# Patient Record
Sex: FEMALE | Race: WHITE | Employment: OTHER | URBAN - METROPOLITAN AREA
[De-identification: names, ages, dates, MRNs, and addresses within clinical notes are randomized per-mention and may not be internally consistent; named-entity substitution may affect disease eponyms.]

---

## 2017-03-01 ENCOUNTER — HOSPITAL ENCOUNTER (OUTPATIENT)
Dept: OTHER | Age: 42
Discharge: HOME | End: 2017-03-01
Attending: NURSE PRACTITIONER | Admitting: NURSE PRACTITIONER

## 2017-03-01 LAB
LITHIUM DOSE AMOUNT: NORMAL
LITHIUM LEVEL: 0.7 MMOL/L (ref 0.6–1.2)

## 2017-07-21 PROBLEM — J44.1 COPD WITH ACUTE EXACERBATION (HCC): Status: ACTIVE | Noted: 2017-07-21

## 2017-07-21 PROBLEM — Z72.0 TOBACCO ABUSE: Status: ACTIVE | Noted: 2017-07-21

## 2017-09-10 ENCOUNTER — HOSPITAL ENCOUNTER (OUTPATIENT)
Dept: OTHER | Age: 42
Discharge: HOME | End: 2017-09-10
Attending: NURSE PRACTITIONER | Admitting: NURSE PRACTITIONER

## 2017-09-16 PROBLEM — Z72.0 TOBACCO ABUSE: Status: RESOLVED | Noted: 2017-07-21 | Resolved: 2017-09-16

## 2017-09-16 PROBLEM — F33.2 MDD (MAJOR DEPRESSIVE DISORDER), RECURRENT SEVERE, WITHOUT PSYCHOSIS (HCC): Status: ACTIVE | Noted: 2017-09-16

## 2017-09-16 PROBLEM — J44.1 COPD WITH ACUTE EXACERBATION (HCC): Status: RESOLVED | Noted: 2017-07-21 | Resolved: 2017-09-16

## 2017-11-15 PROBLEM — T56.891A LITHIUM TOXICITY: Status: ACTIVE | Noted: 2017-11-15

## 2017-11-15 PROBLEM — R79.89 ELEVATED LACTIC ACID LEVEL: Status: ACTIVE | Noted: 2017-11-15

## 2017-11-15 PROBLEM — D72.829 LEUKOCYTOSIS: Status: ACTIVE | Noted: 2017-11-15

## 2017-11-15 PROBLEM — G93.40 ACUTE ENCEPHALOPATHY: Status: ACTIVE | Noted: 2017-11-15

## 2017-11-15 PROBLEM — F14.10 COCAINE ABUSE (HCC): Status: ACTIVE | Noted: 2017-11-15

## 2017-11-15 PROBLEM — F16.10 PCP ABUSE (HCC): Status: ACTIVE | Noted: 2017-11-15

## 2017-11-15 PROBLEM — F19.10 SUBSTANCE ABUSE (HCC): Status: ACTIVE | Noted: 2017-11-15

## 2017-11-15 PROBLEM — E87.1 HYPONATREMIA: Status: ACTIVE | Noted: 2017-11-15

## 2017-11-15 PROBLEM — E87.29 HIGH ANION GAP METABOLIC ACIDOSIS: Status: ACTIVE | Noted: 2017-11-15

## 2017-11-15 PROBLEM — R79.89 ELEVATED LFTS: Status: ACTIVE | Noted: 2017-11-15

## 2017-11-15 PROBLEM — A41.9 SEPSIS (HCC): Status: ACTIVE | Noted: 2017-11-15

## 2017-11-15 PROBLEM — N17.9 ACUTE RENAL FAILURE (ARF) (HCC): Status: ACTIVE | Noted: 2017-11-15

## 2023-05-03 ENCOUNTER — APPOINTMENT (OUTPATIENT)
Dept: GENERAL RADIOLOGY | Age: 48
End: 2023-05-03
Payer: MEDICARE

## 2023-05-03 ENCOUNTER — HOSPITAL ENCOUNTER (INPATIENT)
Age: 48
LOS: 2 days | Discharge: PSYCHIATRIC OR SUBSTANCE USE FACILITY | End: 2023-05-05
Attending: EMERGENCY MEDICINE | Admitting: INTERNAL MEDICINE
Payer: MEDICARE

## 2023-05-03 DIAGNOSIS — T56.891A LITHIUM TOXICITY, ACCIDENTAL OR UNINTENTIONAL, INITIAL ENCOUNTER: ICD-10-CM

## 2023-05-03 DIAGNOSIS — T56.894A LITHIUM TOXICITY, UNDETERMINED INTENT, INITIAL ENCOUNTER: Primary | ICD-10-CM

## 2023-05-03 PROBLEM — I95.9 HYPOTENSION: Status: ACTIVE | Noted: 2023-05-03

## 2023-05-03 PROBLEM — G92.8 TOXIC METABOLIC ENCEPHALOPATHY: Status: ACTIVE | Noted: 2017-11-15

## 2023-05-03 LAB
ALBUMIN SERPL-MCNC: 3.2 G/DL (ref 3.4–5)
ALBUMIN SERPL-MCNC: 3.2 G/DL (ref 3.4–5)
ALBUMIN SERPL-MCNC: 3.9 G/DL (ref 3.4–5)
ALBUMIN/GLOB SERPL: 1.1 {RATIO} (ref 1.1–2.2)
ALBUMIN/GLOB SERPL: 1.3 {RATIO} (ref 1.1–2.2)
ALP SERPL-CCNC: 56 U/L (ref 40–129)
ALP SERPL-CCNC: 68 U/L (ref 40–129)
ALT SERPL-CCNC: 10 U/L (ref 10–40)
ALT SERPL-CCNC: 9 U/L (ref 10–40)
ANION GAP SERPL CALCULATED.3IONS-SCNC: 10 MMOL/L (ref 3–16)
ANION GAP SERPL CALCULATED.3IONS-SCNC: 7 MMOL/L (ref 3–16)
ANION GAP SERPL CALCULATED.3IONS-SCNC: 9 MMOL/L (ref 3–16)
AST SERPL-CCNC: 12 U/L (ref 15–37)
AST SERPL-CCNC: 12 U/L (ref 15–37)
BACTERIA URNS QL MICRO: ABNORMAL /HPF
BASE EXCESS BLDV CALC-SCNC: -2.6 MMOL/L (ref -2–3)
BASOPHILS # BLD: 0 K/UL (ref 0–0.2)
BASOPHILS NFR BLD: 0.3 %
BILIRUB SERPL-MCNC: 0.4 MG/DL (ref 0–1)
BILIRUB SERPL-MCNC: 0.6 MG/DL (ref 0–1)
BILIRUB UR QL STRIP.AUTO: ABNORMAL
BUN SERPL-MCNC: 15 MG/DL (ref 7–20)
BUN SERPL-MCNC: 36 MG/DL (ref 7–20)
BUN SERPL-MCNC: 39 MG/DL (ref 7–20)
CALCIUM SERPL-MCNC: 10 MG/DL (ref 8.3–10.6)
CALCIUM SERPL-MCNC: 8.3 MG/DL (ref 8.3–10.6)
CALCIUM SERPL-MCNC: 9.4 MG/DL (ref 8.3–10.6)
CHLORIDE SERPL-SCNC: 100 MMOL/L (ref 99–110)
CHLORIDE SERPL-SCNC: 100 MMOL/L (ref 99–110)
CHLORIDE SERPL-SCNC: 102 MMOL/L (ref 99–110)
CLARITY UR: CLEAR
CO2 BLDV-SCNC: 27 MMOL/L
CO2 SERPL-SCNC: 23 MMOL/L (ref 21–32)
CO2 SERPL-SCNC: 23 MMOL/L (ref 21–32)
CO2 SERPL-SCNC: 24 MMOL/L (ref 21–32)
COHGB MFR BLDV: 1.3 % (ref 0–1.5)
COLOR UR: YELLOW
CREAT SERPL-MCNC: 0.7 MG/DL (ref 0.6–1.1)
CREAT SERPL-MCNC: 1.6 MG/DL (ref 0.6–1.1)
CREAT SERPL-MCNC: 2.1 MG/DL (ref 0.6–1.1)
DEPRECATED RDW RBC AUTO: 13.8 % (ref 12.4–15.4)
DO-HGB MFR BLDV: 65.4 %
EKG ATRIAL RATE: 71 BPM
EKG DIAGNOSIS: NORMAL
EKG P AXIS: 49 DEGREES
EKG P-R INTERVAL: 224 MS
EKG Q-T INTERVAL: 416 MS
EKG QRS DURATION: 88 MS
EKG QTC CALCULATION (BAZETT): 452 MS
EKG R AXIS: 89 DEGREES
EKG T AXIS: 53 DEGREES
EKG VENTRICULAR RATE: 71 BPM
EOSINOPHIL # BLD: 0.2 K/UL (ref 0–0.6)
EOSINOPHIL NFR BLD: 1.3 %
EPI CELLS #/AREA URNS HPF: ABNORMAL /HPF (ref 0–5)
ETHANOLAMINE SERPL-MCNC: NORMAL MG/DL (ref 0–0.08)
GFR SERPLBLD CREATININE-BSD FMLA CKD-EPI: 29 ML/MIN/{1.73_M2}
GFR SERPLBLD CREATININE-BSD FMLA CKD-EPI: 40 ML/MIN/{1.73_M2}
GFR SERPLBLD CREATININE-BSD FMLA CKD-EPI: >60 ML/MIN/{1.73_M2}
GLUCOSE BLD-MCNC: 82 MG/DL (ref 70–99)
GLUCOSE BLD-MCNC: 82 MG/DL (ref 70–99)
GLUCOSE BLD-MCNC: 98 MG/DL (ref 70–99)
GLUCOSE SERPL-MCNC: 79 MG/DL (ref 70–99)
GLUCOSE SERPL-MCNC: 82 MG/DL (ref 70–99)
GLUCOSE SERPL-MCNC: 94 MG/DL (ref 70–99)
GLUCOSE UR STRIP.AUTO-MCNC: NEGATIVE MG/DL
HCO3 BLDV-SCNC: 24.9 MMOL/L (ref 24–28)
HCT VFR BLD AUTO: 43.5 % (ref 36–48)
HGB BLD-MCNC: 14.4 G/DL (ref 12–16)
HGB UR QL STRIP.AUTO: NEGATIVE
INR PPP: 1.12 (ref 0.84–1.16)
KETONES UR STRIP.AUTO-MCNC: ABNORMAL MG/DL
LACTATE BLDV-SCNC: 0.7 MMOL/L (ref 0.4–1.9)
LACTATE BLDV-SCNC: 0.8 MMOL/L (ref 0.4–1.9)
LEUKOCYTE ESTERASE UR QL STRIP.AUTO: NEGATIVE
LITHIUM DOSE: ABNORMAL MG
LITHIUM SERPL-MCNC: 1.4 MMOL/L (ref 0.6–1.2)
LITHIUM SERPL-MCNC: 3.1 MMOL/L (ref 0.6–1.2)
LITHIUM SERPL-MCNC: 3.3 MMOL/L (ref 0.6–1.2)
LITHIUM SERPL-MCNC: 3.7 MMOL/L (ref 0.6–1.2)
LYMPHOCYTES # BLD: 1 K/UL (ref 1–5.1)
LYMPHOCYTES NFR BLD: 7 %
MCH RBC QN AUTO: 31.5 PG (ref 26–34)
MCHC RBC AUTO-ENTMCNC: 33 G/DL (ref 31–36)
MCV RBC AUTO: 95.4 FL (ref 80–100)
METHGB MFR BLDV: 0.4 % (ref 0–1.5)
MONOCYTES # BLD: 1 K/UL (ref 0–1.3)
MONOCYTES NFR BLD: 6.9 %
MUCOUS THREADS #/AREA URNS LPF: ABNORMAL /LPF
NEUTROPHILS # BLD: 12.5 K/UL (ref 1.7–7.7)
NEUTROPHILS NFR BLD: 84.5 %
NITRITE UR QL STRIP.AUTO: NEGATIVE
NT-PROBNP SERPL-MCNC: 1843 PG/ML (ref 0–124)
PCO2 BLDV: 52.1 MMHG (ref 41–51)
PERFORMED ON: NORMAL
PH BLDV: 7.29 [PH] (ref 7.35–7.45)
PH UR STRIP.AUTO: 6 [PH] (ref 5–8)
PHOSPHATE SERPL-MCNC: 2 MG/DL (ref 2.5–4.9)
PLATELET # BLD AUTO: 180 K/UL (ref 135–450)
PMV BLD AUTO: 8.9 FL (ref 5–10.5)
PO2 BLDV: <30 MMHG (ref 25–40)
POTASSIUM SERPL-SCNC: 4.1 MMOL/L (ref 3.5–5.1)
POTASSIUM SERPL-SCNC: 4.4 MMOL/L (ref 3.5–5.1)
POTASSIUM SERPL-SCNC: 4.5 MMOL/L (ref 3.5–5.1)
PROT SERPL-MCNC: 6 G/DL (ref 6.4–8.2)
PROT SERPL-MCNC: 6.8 G/DL (ref 6.4–8.2)
PROT UR STRIP.AUTO-MCNC: 30 MG/DL
PROTHROMBIN TIME: 14.4 SEC (ref 11.5–14.8)
RBC # BLD AUTO: 4.56 M/UL (ref 4–5.2)
RBC #/AREA URNS HPF: ABNORMAL /HPF (ref 0–4)
SALICYLATES SERPL-MCNC: <0.3 MG/DL (ref 15–30)
SAO2 % BLDV: 34 %
SODIUM SERPL-SCNC: 130 MMOL/L (ref 136–145)
SODIUM SERPL-SCNC: 133 MMOL/L (ref 136–145)
SODIUM SERPL-SCNC: 135 MMOL/L (ref 136–145)
SP GR UR STRIP.AUTO: 1.02 (ref 1–1.03)
TROPONIN, HIGH SENSITIVITY: 65 NG/L (ref 0–14)
UA DIPSTICK W REFLEX MICRO PNL UR: YES
URN SPEC COLLECT METH UR: ABNORMAL
UROBILINOGEN UR STRIP-ACNC: 2 E.U./DL
WBC # BLD AUTO: 14.8 K/UL (ref 4–11)
WBC #/AREA URNS HPF: ABNORMAL /HPF (ref 0–5)

## 2023-05-03 PROCEDURE — 36415 COLL VENOUS BLD VENIPUNCTURE: CPT

## 2023-05-03 PROCEDURE — 2580000003 HC RX 258: Performed by: EMERGENCY MEDICINE

## 2023-05-03 PROCEDURE — 80053 COMPREHEN METABOLIC PANEL: CPT

## 2023-05-03 PROCEDURE — 82077 ASSAY SPEC XCP UR&BREATH IA: CPT

## 2023-05-03 PROCEDURE — 05HM33Z INSERTION OF INFUSION DEVICE INTO RIGHT INTERNAL JUGULAR VEIN, PERCUTANEOUS APPROACH: ICD-10-PCS

## 2023-05-03 PROCEDURE — 2580000003 HC RX 258

## 2023-05-03 PROCEDURE — 71045 X-RAY EXAM CHEST 1 VIEW: CPT

## 2023-05-03 PROCEDURE — 83880 ASSAY OF NATRIURETIC PEPTIDE: CPT

## 2023-05-03 PROCEDURE — 99285 EMERGENCY DEPT VISIT HI MDM: CPT

## 2023-05-03 PROCEDURE — 86706 HEP B SURFACE ANTIBODY: CPT

## 2023-05-03 PROCEDURE — 80178 ASSAY OF LITHIUM: CPT

## 2023-05-03 PROCEDURE — 85610 PROTHROMBIN TIME: CPT

## 2023-05-03 PROCEDURE — 80179 DRUG ASSAY SALICYLATE: CPT

## 2023-05-03 PROCEDURE — 83605 ASSAY OF LACTIC ACID: CPT

## 2023-05-03 PROCEDURE — 93005 ELECTROCARDIOGRAM TRACING: CPT | Performed by: EMERGENCY MEDICINE

## 2023-05-03 PROCEDURE — 96361 HYDRATE IV INFUSION ADD-ON: CPT

## 2023-05-03 PROCEDURE — 85025 COMPLETE CBC W/AUTO DIFF WBC: CPT

## 2023-05-03 PROCEDURE — 81001 URINALYSIS AUTO W/SCOPE: CPT

## 2023-05-03 PROCEDURE — 5A1D70Z PERFORMANCE OF URINARY FILTRATION, INTERMITTENT, LESS THAN 6 HOURS PER DAY: ICD-10-PCS | Performed by: INTERNAL MEDICINE

## 2023-05-03 PROCEDURE — 2000000000 HC ICU R&B

## 2023-05-03 PROCEDURE — 90935 HEMODIALYSIS ONE EVALUATION: CPT

## 2023-05-03 PROCEDURE — 6360000002 HC RX W HCPCS

## 2023-05-03 PROCEDURE — C9113 INJ PANTOPRAZOLE SODIUM, VIA: HCPCS

## 2023-05-03 PROCEDURE — 84484 ASSAY OF TROPONIN QUANT: CPT

## 2023-05-03 PROCEDURE — 82803 BLOOD GASES ANY COMBINATION: CPT

## 2023-05-03 PROCEDURE — 87340 HEPATITIS B SURFACE AG IA: CPT

## 2023-05-03 PROCEDURE — 96360 HYDRATION IV INFUSION INIT: CPT

## 2023-05-03 RX ORDER — SODIUM CHLORIDE, SODIUM LACTATE, POTASSIUM CHLORIDE, AND CALCIUM CHLORIDE .6; .31; .03; .02 G/100ML; G/100ML; G/100ML; G/100ML
1000 INJECTION, SOLUTION INTRAVENOUS ONCE
Status: COMPLETED | OUTPATIENT
Start: 2023-05-03 | End: 2023-05-03

## 2023-05-03 RX ORDER — POLYETHYLENE GLYCOL 3350 17 G/17G
17 POWDER, FOR SOLUTION ORAL DAILY PRN
Status: DISCONTINUED | OUTPATIENT
Start: 2023-05-03 | End: 2023-05-06 | Stop reason: HOSPADM

## 2023-05-03 RX ORDER — RISPERIDONE 3 MG/1
3 TABLET ORAL EVERY EVENING
Status: ON HOLD | COMMUNITY
End: 2023-05-05 | Stop reason: HOSPADM

## 2023-05-03 RX ORDER — ACETAMINOPHEN 325 MG/1
650 TABLET ORAL EVERY 6 HOURS PRN
Status: ON HOLD | COMMUNITY
End: 2023-05-11 | Stop reason: HOSPADM

## 2023-05-03 RX ORDER — HYDROXYZINE PAMOATE 50 MG/1
50 CAPSULE ORAL 3 TIMES DAILY PRN
Status: ON HOLD | COMMUNITY
End: 2023-05-11 | Stop reason: HOSPADM

## 2023-05-03 RX ORDER — ACETAMINOPHEN 650 MG/1
650 SUPPOSITORY RECTAL EVERY 6 HOURS PRN
Status: DISCONTINUED | OUTPATIENT
Start: 2023-05-03 | End: 2023-05-06 | Stop reason: HOSPADM

## 2023-05-03 RX ORDER — GLUCAGON 1 MG/ML
1 KIT INJECTION PRN
Status: DISCONTINUED | OUTPATIENT
Start: 2023-05-03 | End: 2023-05-06 | Stop reason: HOSPADM

## 2023-05-03 RX ORDER — ESCITALOPRAM OXALATE 10 MG/1
10 TABLET ORAL DAILY
Status: ON HOLD | COMMUNITY
End: 2023-05-11 | Stop reason: SDUPTHER

## 2023-05-03 RX ORDER — NICOTINE 21 MG/24HR
1 PATCH, TRANSDERMAL 24 HOURS TRANSDERMAL EVERY 24 HOURS
Status: ON HOLD | COMMUNITY
End: 2023-05-11 | Stop reason: HOSPADM

## 2023-05-03 RX ORDER — MAGNESIUM HYDROXIDE/ALUMINUM HYDROXICE/SIMETHICONE 120; 1200; 1200 MG/30ML; MG/30ML; MG/30ML
30 SUSPENSION ORAL EVERY 4 HOURS PRN
Status: ON HOLD | COMMUNITY
End: 2023-05-11 | Stop reason: HOSPADM

## 2023-05-03 RX ORDER — LITHIUM CARBONATE 300 MG/1
600 CAPSULE ORAL 2 TIMES DAILY
Status: ON HOLD | COMMUNITY
End: 2023-05-05 | Stop reason: HOSPADM

## 2023-05-03 RX ORDER — BUPRENORPHINE AND NALOXONE 4; 1 MG/1; MG/1
1 FILM, SOLUBLE BUCCAL; SUBLINGUAL 2 TIMES DAILY
Status: ON HOLD | COMMUNITY
End: 2023-05-11 | Stop reason: HOSPADM

## 2023-05-03 RX ORDER — INSULIN LISPRO 100 [IU]/ML
0-4 INJECTION, SOLUTION INTRAVENOUS; SUBCUTANEOUS NIGHTLY
Status: DISCONTINUED | OUTPATIENT
Start: 2023-05-03 | End: 2023-05-06 | Stop reason: HOSPADM

## 2023-05-03 RX ORDER — LISINOPRIL 20 MG/1
20 TABLET ORAL DAILY
Status: ON HOLD | COMMUNITY
End: 2023-05-05 | Stop reason: HOSPADM

## 2023-05-03 RX ORDER — SODIUM CHLORIDE 0.9 % (FLUSH) 0.9 %
5-40 SYRINGE (ML) INJECTION EVERY 12 HOURS SCHEDULED
Status: DISCONTINUED | OUTPATIENT
Start: 2023-05-03 | End: 2023-05-06 | Stop reason: HOSPADM

## 2023-05-03 RX ORDER — PANTOPRAZOLE SODIUM 40 MG/10ML
40 INJECTION, POWDER, LYOPHILIZED, FOR SOLUTION INTRAVENOUS DAILY
Status: DISCONTINUED | OUTPATIENT
Start: 2023-05-03 | End: 2023-05-06 | Stop reason: HOSPADM

## 2023-05-03 RX ORDER — DEXTROSE MONOHYDRATE 100 MG/ML
INJECTION, SOLUTION INTRAVENOUS CONTINUOUS PRN
Status: DISCONTINUED | OUTPATIENT
Start: 2023-05-03 | End: 2023-05-06 | Stop reason: HOSPADM

## 2023-05-03 RX ORDER — HEPARIN SODIUM 1000 [USP'U]/ML
2400 INJECTION, SOLUTION INTRAVENOUS; SUBCUTANEOUS PRN
Status: DISCONTINUED | OUTPATIENT
Start: 2023-05-03 | End: 2023-05-06 | Stop reason: HOSPADM

## 2023-05-03 RX ORDER — SODIUM CHLORIDE, SODIUM LACTATE, POTASSIUM CHLORIDE, CALCIUM CHLORIDE 600; 310; 30; 20 MG/100ML; MG/100ML; MG/100ML; MG/100ML
1000 INJECTION, SOLUTION INTRAVENOUS CONTINUOUS
Status: DISCONTINUED | OUTPATIENT
Start: 2023-05-03 | End: 2023-05-03

## 2023-05-03 RX ORDER — FENOFIBRATE 160 MG/1
160 TABLET ORAL DAILY
Status: ON HOLD | COMMUNITY
End: 2023-05-11 | Stop reason: HOSPADM

## 2023-05-03 RX ORDER — ACETAMINOPHEN 325 MG/1
650 TABLET ORAL EVERY 6 HOURS PRN
Status: DISCONTINUED | OUTPATIENT
Start: 2023-05-03 | End: 2023-05-06 | Stop reason: HOSPADM

## 2023-05-03 RX ORDER — SODIUM CHLORIDE 9 MG/ML
INJECTION, SOLUTION INTRAVENOUS CONTINUOUS
Status: ACTIVE | OUTPATIENT
Start: 2023-05-03 | End: 2023-05-03

## 2023-05-03 RX ORDER — SODIUM CHLORIDE 0.9 % (FLUSH) 0.9 %
5-40 SYRINGE (ML) INJECTION PRN
Status: DISCONTINUED | OUTPATIENT
Start: 2023-05-03 | End: 2023-05-06 | Stop reason: HOSPADM

## 2023-05-03 RX ORDER — INSULIN GLARGINE 100 [IU]/ML
15 INJECTION, SOLUTION SUBCUTANEOUS 2 TIMES DAILY
Status: ON HOLD | COMMUNITY
End: 2023-05-05 | Stop reason: HOSPADM

## 2023-05-03 RX ORDER — SODIUM CHLORIDE, SODIUM LACTATE, POTASSIUM CHLORIDE, CALCIUM CHLORIDE 600; 310; 30; 20 MG/100ML; MG/100ML; MG/100ML; MG/100ML
1000 INJECTION, SOLUTION INTRAVENOUS CONTINUOUS
Status: ACTIVE | OUTPATIENT
Start: 2023-05-03 | End: 2023-05-03

## 2023-05-03 RX ORDER — SODIUM CHLORIDE 9 MG/ML
INJECTION, SOLUTION INTRAVENOUS PRN
Status: DISCONTINUED | OUTPATIENT
Start: 2023-05-03 | End: 2023-05-06 | Stop reason: HOSPADM

## 2023-05-03 RX ORDER — LEVOTHYROXINE SODIUM 0.03 MG/1
25 TABLET ORAL
Status: ON HOLD | COMMUNITY
End: 2023-05-11 | Stop reason: SDUPTHER

## 2023-05-03 RX ORDER — HEPARIN SODIUM 1000 [USP'U]/ML
10000 INJECTION, SOLUTION INTRAVENOUS; SUBCUTANEOUS ONCE
Status: COMPLETED | OUTPATIENT
Start: 2023-05-03 | End: 2023-05-03

## 2023-05-03 RX ORDER — INSULIN LISPRO 100 [IU]/ML
0-4 INJECTION, SOLUTION INTRAVENOUS; SUBCUTANEOUS
Status: DISCONTINUED | OUTPATIENT
Start: 2023-05-03 | End: 2023-05-06 | Stop reason: HOSPADM

## 2023-05-03 RX ADMIN — SODIUM CHLORIDE: 9 INJECTION, SOLUTION INTRAVENOUS at 14:55

## 2023-05-03 RX ADMIN — SODIUM CHLORIDE, POTASSIUM CHLORIDE, SODIUM LACTATE AND CALCIUM CHLORIDE 1000 ML: 600; 310; 30; 20 INJECTION, SOLUTION INTRAVENOUS at 12:37

## 2023-05-03 RX ADMIN — SODIUM CHLORIDE, POTASSIUM CHLORIDE, SODIUM LACTATE AND CALCIUM CHLORIDE 1000 ML: 600; 310; 30; 20 INJECTION, SOLUTION INTRAVENOUS at 10:06

## 2023-05-03 RX ADMIN — PANTOPRAZOLE SODIUM 40 MG: 40 INJECTION, POWDER, FOR SOLUTION INTRAVENOUS at 14:57

## 2023-05-03 RX ADMIN — HEPARIN SODIUM 10000 UNITS: 1000 INJECTION INTRAVENOUS; SUBCUTANEOUS at 16:24

## 2023-05-03 ASSESSMENT — PAIN - FUNCTIONAL ASSESSMENT: PAIN_FUNCTIONAL_ASSESSMENT: NONE - DENIES PAIN

## 2023-05-03 NOTE — ED PROVIDER NOTES
personality disorder ([facility] CAP (community acquired pneumonia), Chronic kidney disease, Depression, Diabetes mellitus ([facility] Drug abuse ([facility] GERD (gastroesophageal reflux disease), Headache, Hyperlipidemia, Influenza A, Morbid obesity ([facility] Narcotic addiction ([facility] Pain management, Pseudotumor cerebri, Sleep apnea, Suicide ideation, and Wears glasses. She has a past surgical history that includes Gallbladder surgery; Tubal ligation; Dilation and curettage of uterus; Tonsillectomy; Cystoscopy; Endometrial ablation; Cholecystectomy; fracture surgery; Colonoscopy; Upper gastrointestinal endoscopy (2012); Cystocopy (5/816/14); knee surgery; and Endoscopy, colon, diagnostic. Her family history includes Cancer in her maternal grandmother; Diabetes in her father, maternal aunt, maternal grandfather, maternal grandmother, maternal uncle, and mother; High Blood Pressure in her maternal grandfather, maternal grandmother, and mother; High Cholesterol in her mother. She reports that she has quit smoking. Her smoking use included cigarettes. She has a 6.00 pack-year smoking history. She has never used smokeless tobacco. She reports current drug use. Drugs: IV and Opiates . She reports that she does not drink alcohol. Medications     Previous Medications    ACETAMINOPHEN (TYLENOL) 325 MG TABLET    Take 2 tablets by mouth every 6 hours as needed for Pain (headache)    ALUMINUM & MAGNESIUM HYDROXIDE-SIMETHICONE (MAALOX) 200-200-20 MG/5ML SUSP SUSPENSION    Take 30 mLs by mouth every 4 hours as needed for Indigestion    BUPRENORPHINE-NALOXONE (SUBOXONE) 4-1 MG FILM SL FILM    Place 1 Film under the tongue 2 times daily.  for OUD    ESCITALOPRAM (LEXAPRO) 10 MG TABLET    Take 1 tablet by mouth daily    FENOFIBRATE (TRIGLIDE) 160 MG TABLET    Take 1 tablet by mouth daily    HYDROXYZINE PAMOATE (VISTARIL) 50 MG CAPSULE    Take 1 capsule by mouth 3 times daily as needed for Anxiety    INSULIN GLARGINE (LANTUS) 100 UNIT/ML

## 2023-05-03 NOTE — H&P
ICU HISTORY AND PHYSICAL       Hospital Day: 1  ICU Day: 1                                                         Code:Full Code  Admit Date: 5/3/2023  PCP: Anahi Avina                                  CC: lethargy    HISTORY OF PRESENT ILLNESS:   This is a 56yo F with Pmhx of severe borderline personality disorder, bipolar disorder, DM2, HLD, morbid obesity who presents with lethargy. Patient lives at [facility]. Per staff there, patient was increasingly lethargic and difficult to arouse over the last 2-3 days. She was sleeping all day and would \"sleep while standing up. \" When staff would give her meals or drinks, she would not be awake enough to eat or drink. She is on lithium, risperidone, seroquel, escitalopram at home. Additionally, she is on lisinopril and hydroxyzine. These medications have been held since 4/30 when the lethargy started. No recent medications changes or new medications. Today, patient is able to be aroused by voice, however she falls asleep very quickly. She states she has not been eating or drinking very well. In the ED, patient was hypotensive to 80s/40s with improvement to 100s/60s after 2L IVF boluses. Labs significant for NERY with Cr 2.1, BUN 39, hyponatremia 133, leukocytosis to 14.8. Her lithium level was elevated to 3.7. UA with trace ketones. VBG 7.288, pCO2 52.1, HCO3 24.9. Chest XR with low lung volumes and mild pulmonary vascular congestion. Poison control contacted and they recommend continued hydration, Q4hr lithium checks. Nephrology contacted ASAP and they will start patient on dialysis. General surgery consulted for vas cath placement.     PAST HISTORY:     Past Medical History:   Diagnosis Date    Arthritis     Asthma     Bipolar disorder ([facility]     Borderline personality disorder ([facility]     CAP (community acquired pneumonia) 9/29/2015    Chronic kidney disease     proteinuria    Depression     Diabetes mellitus ([address]     Drug abuse ([facility]

## 2023-05-03 NOTE — ED NOTES
Clinical Pharmacy Progress Note  Medication History     ED Encounter Date: 5/3/23 at 10:58 AM     List of of current medications patient is taking is complete. Home Medication list in EPIC updated to reflect changes noted below. Source of information: Transfer documents provided by patient's nursing facility [facility]) available at bedside    Updated Prior to Admission Medication List (as of 5/3/2023 10:58 AM):   Current Outpatient Medications   Medication Instructions    acetaminophen (TYLENOL) 650 mg, Oral, EVERY 6 HOURS PRN    aluminum & magnesium hydroxide-simethicone (MAALOX) 200-200-20 MG/5ML SUSP suspension 30 mLs, Oral, EVERY 4 HOURS PRN    buprenorphine-naloxone (SUBOXONE) 4-1 MG FILM SL film 1 Film, SubLINGual, 2 TIMES DAILY, for OUD    escitalopram (LEXAPRO) 10 mg, Oral, DAILY    fenofibrate (TRIGLIDE) 160 mg, Oral, DAILY    hydrOXYzine pamoate (VISTARIL) 50 mg, Oral, 3 TIMES DAILY PRN    insulin glargine (LANTUS) 15 Units, SubCUTAneous, 2 TIMES DAILY    insulin lispro (HUMALOG) 100 UNIT/ML injection vial Inject sliding scale insulin lispro SC four times daily (before meals and at bedtime) as needed per sliding scale instructions below. <BR>BG : 0 units<BR>200-249: 2 units<BR>250-299: 4 units<BR>300-349: 6 units<BR>350-399: 8 units<BR>> or = 400: 10 units    levothyroxine (SYNTHROID) 25 mcg, Oral, DAILY BEFORE BREAKFAST    lisinopril (PRINIVIL;ZESTRIL) 20 mg, Oral, DAILY    lithium 600 mg, Oral, 2 TIMES DAILY    nicotine (NICODERM CQ) 21 MG/24HR 1 patch, TransDERmal, EVERY 24 HOURS    risperiDONE (RISPERDAL) 3 mg, Oral, EVERY EVENING       Please call with any questions.     Barbee Nyhan, PharmD, [facility]  Clinical Pharmacist - Emergency Dept  Wireless: [phone]  5/3/2023 10:59 AM

## 2023-05-03 NOTE — PROCEDURES
Valentín Bobo is a 53 y.o. female patient. 1. Lithium toxicity, undetermined intent, initial encounter      Past Medical History:   Diagnosis Date    Arthritis     Asthma     Bipolar disorder ([facility]     Borderline personality disorder ([facility]     CAP (community acquired pneumonia) 9/29/2015    Chronic kidney disease     proteinuria    Depression     Diabetes mellitus ([facility]     Drug abuse ([facility]     GERD (gastroesophageal reflux disease)     Headache     Hyperlipidemia     Influenza A 12/31/14    Morbid obesity ([facility]     Narcotic addiction ([facility]     Pain management     Pseudotumor cerebri     dx 3/12 by neurologist. OP [id number] 11/13/13    Sleep apnea     Suicide ideation     chronic    Wears glasses      Blood pressure 114/69, pulse 70, temperature 98.6 °F (37 °C), temperature source Oral, resp. rate 28, weight 238 lb 1.6 oz (108 kg), SpO2 92 %, not currently breastfeeding. Central Line    Date/Time: 5/3/2023 4:33 PM  Performed by: Andrew Rudolph DO  Authorized by: Bessie Lira MD   Consent: The procedure was performed in an emergent situation. Test results: test results available and properly labeled  Site marked: the operative site was marked  Imaging studies: imaging studies available  Required items: required blood products, implants, devices, and special equipment available  Patient identity confirmed: arm band and hospital-assigned identification number  Time out: Immediately prior to procedure a \"time out\" was called to verify the correct patient, procedure, equipment, support staff and site/side marked as required.   Indications: vascular access    Anesthesia:  Local Anesthetic: lidocaine 1% without epinephrine  Anesthetic total: 10 mL    Sedation:  Patient sedated: no    Preparation: skin prepped with ChloraPrep  Skin prep agent dried: skin prep agent completely dried prior to procedure  Sterile barriers: all five maximum sterile barriers used - cap, mask, sterile gown, sterile gloves, and

## 2023-05-03 NOTE — ED NOTES
Pt placed on bedpan but unable to void, external catheter in place      Elisabeth Garcia, RN  05/03/23 [id number]

## 2023-05-04 LAB
ALBUMIN SERPL-MCNC: 3.2 G/DL (ref 3.4–5)
ALBUMIN SERPL-MCNC: 3.3 G/DL (ref 3.4–5)
ALBUMIN/GLOB SERPL: 1.2 {RATIO} (ref 1.1–2.2)
ALP SERPL-CCNC: 65 U/L (ref 40–129)
ALT SERPL-CCNC: 9 U/L (ref 10–40)
ANION GAP SERPL CALCULATED.3IONS-SCNC: 8 MMOL/L (ref 3–16)
ANION GAP SERPL CALCULATED.3IONS-SCNC: 9 MMOL/L (ref 3–16)
AST SERPL-CCNC: 12 U/L (ref 15–37)
BASOPHILS # BLD: 0.1 K/UL (ref 0–0.2)
BASOPHILS NFR BLD: 0.5 %
BILIRUB SERPL-MCNC: 0.6 MG/DL (ref 0–1)
BUN SERPL-MCNC: 10 MG/DL (ref 7–20)
BUN SERPL-MCNC: 13 MG/DL (ref 7–20)
CALCIUM SERPL-MCNC: 8.7 MG/DL (ref 8.3–10.6)
CALCIUM SERPL-MCNC: 8.8 MG/DL (ref 8.3–10.6)
CHLORIDE SERPL-SCNC: 103 MMOL/L (ref 99–110)
CHLORIDE SERPL-SCNC: 105 MMOL/L (ref 99–110)
CO2 SERPL-SCNC: 24 MMOL/L (ref 21–32)
CO2 SERPL-SCNC: 24 MMOL/L (ref 21–32)
CREAT SERPL-MCNC: 0.7 MG/DL (ref 0.6–1.1)
CREAT SERPL-MCNC: 0.7 MG/DL (ref 0.6–1.1)
DEPRECATED RDW RBC AUTO: 13.9 % (ref 12.4–15.4)
EOSINOPHIL # BLD: 0.4 K/UL (ref 0–0.6)
EOSINOPHIL NFR BLD: 3.9 %
GFR SERPLBLD CREATININE-BSD FMLA CKD-EPI: >60 ML/MIN/{1.73_M2}
GFR SERPLBLD CREATININE-BSD FMLA CKD-EPI: >60 ML/MIN/{1.73_M2}
GLUCOSE BLD-MCNC: 123 MG/DL (ref 70–99)
GLUCOSE BLD-MCNC: 168 MG/DL (ref 70–99)
GLUCOSE BLD-MCNC: 185 MG/DL (ref 70–99)
GLUCOSE BLD-MCNC: 60 MG/DL (ref 70–99)
GLUCOSE BLD-MCNC: 66 MG/DL (ref 70–99)
GLUCOSE BLD-MCNC: 80 MG/DL (ref 70–99)
GLUCOSE BLD-MCNC: 97 MG/DL (ref 70–99)
GLUCOSE SERPL-MCNC: 68 MG/DL (ref 70–99)
GLUCOSE SERPL-MCNC: 70 MG/DL (ref 70–99)
HBV SURFACE AB SERPL IA-ACNC: <3.5 MIU/ML
HBV SURFACE AG SERPL QL IA: NORMAL
HCT VFR BLD AUTO: 39.4 % (ref 36–48)
HGB BLD-MCNC: 13.2 G/DL (ref 12–16)
LACTATE BLDV-SCNC: 0.7 MMOL/L (ref 0.4–2)
LITHIUM DOSE: ABNORMAL MG
LITHIUM DOSE: NORMAL MG
LITHIUM DOSE: NORMAL MG
LITHIUM SERPL-MCNC: 1.3 MMOL/L (ref 0.6–1.2)
LITHIUM SERPL-MCNC: 1.3 MMOL/L (ref 0.6–1.2)
LITHIUM SERPL-MCNC: 1.4 MMOL/L (ref 0.6–1.2)
LITHIUM SERPL-MCNC: 1.4 MMOL/L (ref 0.6–1.2)
LITHIUM SERPL-MCNC: 1.5 MMOL/L (ref 0.6–1.2)
LITHIUM SERPL-MCNC: 2.5 MMOL/L (ref 0.6–1.2)
LYMPHOCYTES # BLD: 1.5 K/UL (ref 1–5.1)
LYMPHOCYTES NFR BLD: 14.8 %
MAGNESIUM SERPL-MCNC: 1.8 MG/DL (ref 1.8–2.4)
MCH RBC QN AUTO: 31.5 PG (ref 26–34)
MCHC RBC AUTO-ENTMCNC: 33.3 G/DL (ref 31–36)
MCV RBC AUTO: 94.5 FL (ref 80–100)
MONOCYTES # BLD: 1.1 K/UL (ref 0–1.3)
MONOCYTES NFR BLD: 10.6 %
NEUTROPHILS # BLD: 7.3 K/UL (ref 1.7–7.7)
NEUTROPHILS NFR BLD: 70.2 %
PERFORMED ON: ABNORMAL
PERFORMED ON: NORMAL
PERFORMED ON: NORMAL
PHOSPHATE SERPL-MCNC: 2 MG/DL (ref 2.5–4.9)
PLATELET # BLD AUTO: 155 K/UL (ref 135–450)
PMV BLD AUTO: 8.8 FL (ref 5–10.5)
POTASSIUM SERPL-SCNC: 3.8 MMOL/L (ref 3.5–5.1)
POTASSIUM SERPL-SCNC: 3.9 MMOL/L (ref 3.5–5.1)
PROT SERPL-MCNC: 6 G/DL (ref 6.4–8.2)
RBC # BLD AUTO: 4.17 M/UL (ref 4–5.2)
SODIUM SERPL-SCNC: 136 MMOL/L (ref 136–145)
SODIUM SERPL-SCNC: 137 MMOL/L (ref 136–145)
TROPONIN, HIGH SENSITIVITY: 45 NG/L (ref 0–14)
WBC # BLD AUTO: 10.4 K/UL (ref 4–11)

## 2023-05-04 PROCEDURE — 36415 COLL VENOUS BLD VENIPUNCTURE: CPT

## 2023-05-04 PROCEDURE — 2060000000 HC ICU INTERMEDIATE R&B

## 2023-05-04 PROCEDURE — 6360000002 HC RX W HCPCS

## 2023-05-04 PROCEDURE — 84484 ASSAY OF TROPONIN QUANT: CPT

## 2023-05-04 PROCEDURE — 2580000003 HC RX 258

## 2023-05-04 PROCEDURE — 80053 COMPREHEN METABOLIC PANEL: CPT

## 2023-05-04 PROCEDURE — 93005 ELECTROCARDIOGRAM TRACING: CPT

## 2023-05-04 PROCEDURE — 92610 EVALUATE SWALLOWING FUNCTION: CPT

## 2023-05-04 PROCEDURE — 83735 ASSAY OF MAGNESIUM: CPT

## 2023-05-04 PROCEDURE — 6370000000 HC RX 637 (ALT 250 FOR IP)

## 2023-05-04 PROCEDURE — 83605 ASSAY OF LACTIC ACID: CPT

## 2023-05-04 PROCEDURE — C9113 INJ PANTOPRAZOLE SODIUM, VIA: HCPCS

## 2023-05-04 PROCEDURE — 80178 ASSAY OF LITHIUM: CPT

## 2023-05-04 PROCEDURE — 99223 1ST HOSP IP/OBS HIGH 75: CPT | Performed by: INTERNAL MEDICINE

## 2023-05-04 PROCEDURE — 85025 COMPLETE CBC W/AUTO DIFF WBC: CPT

## 2023-05-04 PROCEDURE — 2500000003 HC RX 250 WO HCPCS

## 2023-05-04 RX ORDER — HEPARIN SODIUM 5000 [USP'U]/ML
5000 INJECTION, SOLUTION INTRAVENOUS; SUBCUTANEOUS EVERY 8 HOURS SCHEDULED
Status: DISCONTINUED | OUTPATIENT
Start: 2023-05-04 | End: 2023-05-06 | Stop reason: HOSPADM

## 2023-05-04 RX ORDER — HALOPERIDOL 5 MG/1
5 TABLET ORAL EVERY 6 HOURS PRN
Status: DISCONTINUED | OUTPATIENT
Start: 2023-05-04 | End: 2023-05-06 | Stop reason: HOSPADM

## 2023-05-04 RX ORDER — HALOPERIDOL 5 MG/ML
5 INJECTION INTRAMUSCULAR EVERY 6 HOURS PRN
Status: DISCONTINUED | OUTPATIENT
Start: 2023-05-04 | End: 2023-05-06 | Stop reason: HOSPADM

## 2023-05-04 RX ORDER — NICOTINE 21 MG/24HR
1 PATCH, TRANSDERMAL 24 HOURS TRANSDERMAL EVERY 24 HOURS
Status: DISCONTINUED | OUTPATIENT
Start: 2023-05-04 | End: 2023-05-06 | Stop reason: HOSPADM

## 2023-05-04 RX ADMIN — SODIUM CHLORIDE, PRESERVATIVE FREE 10 ML: 5 INJECTION INTRAVENOUS at 20:51

## 2023-05-04 RX ADMIN — HEPARIN SODIUM 5000 UNITS: 5000 INJECTION INTRAVENOUS; SUBCUTANEOUS at 15:01

## 2023-05-04 RX ADMIN — PANTOPRAZOLE SODIUM 40 MG: 40 INJECTION, POWDER, FOR SOLUTION INTRAVENOUS at 09:38

## 2023-05-04 RX ADMIN — HEPARIN SODIUM 5000 UNITS: 5000 INJECTION INTRAVENOUS; SUBCUTANEOUS at 22:11

## 2023-05-04 RX ADMIN — SODIUM PHOSPHATE, MONOBASIC, MONOHYDRATE 20 MMOL: 276; 142 INJECTION, SOLUTION INTRAVENOUS at 01:58

## 2023-05-04 RX ADMIN — Medication 500 MG: at 20:47

## 2023-05-04 RX ADMIN — Medication 1000 MG: at 09:38

## 2023-05-04 RX ADMIN — DEXTROSE AND SODIUM CHLORIDE: 5; 900 INJECTION, SOLUTION INTRAVENOUS at 07:07

## 2023-05-04 NOTE — CONSULTS
ICU Consult Note  PGY-1    PCP: Celina Lizarraga    Code:Full Code  Admit Date: 5/3/2023  Vent Settings:    / / /   Diet: ADULT DIET; Regular; 3 carb choices (45 gm/meal)    Interval History  Patient was seen and examined at bedside. More awake and interactive this morning. She complained of chest tightness which has since resolved. NERY has resolved, serum Lithium is WNL  She will be started on a diet and transferred to the floors. History of present illness: This is a 56yo F with Pmhx of severe borderline personality disorder, bipolar disorder, DM2, HLD, morbid obesity who presents with lethargy. Patient lives at [facility]. Per staff there, patient was increasingly lethargic and difficult to arouse over the last 2-3 days. She was sleeping all day and would \"sleep while standing up. \" When staff would give her meals or drinks, she would not be awake enough to eat or drink. She is on lithium, risperidone, seroquel, escitalopram at home. Additionally, she is on lisinopril and hydroxyzine. These medications have been held since 4/30 when the lethargy started. No recent medications changes or new medications. Today, patient is able to be aroused by voice, however she falls asleep very quickly. She states she has not been eating or drinking very well. In the ED, patient was hypotensive to 80s/40s with improvement to 100s/60s after 2L IVF boluses. Labs significant for NERY with Cr 2.1, BUN 39, hyponatremia 133, leukocytosis to 14.8. Her lithium level was elevated to 3.7. UA with trace ketones. VBG 7.288, pCO2 52.1, HCO3 24.9. Chest XR with low lung volumes and mild pulmonary vascular congestion. Poison control contacted and they recommend continued hydration, Q4hr lithium checks. Nephrology contacted ASAP and they will start patient on dialysis. General surgery consulted for vas cath placement.     Past Medical / Surgical History:    Past Medical History:   Diagnosis Date
Nephrology Consult Note  [phone]   [email or web address]        Reason for Consult:  Lithium toxicity     HISTORY OF PRESENT ILLNESS:                This is a patient with significant past medical history of bipolar illness on lithium  who presents with encephalopathy ,lithium toxicity and NERY she is in ECF and meds are reviewed as per pharmacist, patient is also on Lisinopril. She has an inital lithium level of 3.7, Cr of 2.1 with AND she was in [facility] ED in feb 2023 her Cr was normal and Li level was 1.2 ,she has been in ECF due to suicidal ideation. She is somnolent and confused and unable to answer questions . Past Medical History:        Diagnosis Date    Arthritis     Asthma     Bipolar disorder ([address]     Borderline personality disorder ([address]     CAP (community acquired pneumonia) 9/29/2015    Chronic kidney disease     proteinuria    Depression     Diabetes mellitus ([address]     Drug abuse ([address]     GERD (gastroesophageal reflux disease)     Headache     Hyperlipidemia     Influenza A 12/31/14    Morbid obesity ([address]     Narcotic addiction ([address]     Pain management     Pseudotumor cerebri     dx 3/12 by neurologist. OP [id number] 11/13/13    Sleep apnea     Suicide ideation     chronic    Wears glasses        Past Surgical History:        Procedure Laterality Date    CHOLECYSTECTOMY      COLONOSCOPY      17 YRS AGO ,NORMAL    CYSTOSCOPY      CYSTOSCOPY  5/816/14    CYSTOSCOPY, URETHRAL DILATATION      DILATION AND CURETTAGE OF UTERUS      ENDOMETRIAL ABLATION      ENDOSCOPY, COLON, DIAGNOSTIC      FRACTURE SURGERY      femur, left    GALLBLADDER SURGERY      KNEE SURGERY      TONSILLECTOMY      TUBAL LIGATION      UPPER GASTROINTESTINAL ENDOSCOPY  2012    DONE X2 GERD       Current Medications:    No current facility-administered medications on file prior to encounter.      Current Outpatient Medications on File Prior to Encounter   Medication Sig Dispense Refill    buprenorphine-naloxone (SUBOXONE) 4-1
Psychiatry Initial Consultation    Patient Name: Kehinde Hsieh  MRN: [id number]  Admission Date: 5/3/2023    Reason for Consult: PMH of borderline personality disorder, suicidal ideation, came in with lithium toxicity, suspected suicidal ideation on this admission; from inpatient psych    HPI:   Kehinde Hsieh is a 53 y.o. female who presented to the hospital from [facility] on 05/03/2023 with a chief complaint of altered mental status. Per ED documentation, It is unclear when the patient was last seen normal. She reportedly is typically alert and oriented and is in the psychiatric facility voluntarily due to suicidal ideation. Patient is quite somnolent and falls asleep easily so unable to give any information about why she is here or what might be bothering her. She was sent with a medication list that shows that yesterday evening she received her regular doses of Seroquel and risperidone. No medications were given this morning. Her Lithium level was found to be 3.7. Chart review indicates a history of being on dual antipsychotic therapy. Also has a history of being on multiple mood stabilizers at the same time. Met with Gita, who appeared somnolent; responded to questions but would trail off and not complete her thought or sentence. She believes she is currently on an inpatient psychiatric unit; able to identify it is May 2023. She is unable to tell me why she is in the hospital, \"I don't know\". When asked why she was at [facility] prior to admission to [facility] states \"I don't know\". Unable to identify if she was experiencing depression or suicidal ideation prior to [facility]. When asked about medications, specifically lithium, she was unable to tell me how long she has been on it or if she has had recent dose changes. Also unable to tell me if she had been eating or drinking normally; chart review indicates she has not.  She reports she is established with an outpatient psychiatrist who she sees through telehealth (
daily   Yes Historical Provider, MD   insulin glargine (LANTUS) 100 UNIT/ML injection vial Inject 15 Units into the skin 2 times daily   Yes Historical Provider, MD   levothyroxine (SYNTHROID) 25 MCG tablet Take 1 tablet by mouth every morning (before breakfast)   Yes Historical Provider, MD   lisinopril (PRINIVIL;ZESTRIL) 20 MG tablet Take 1 tablet by mouth daily   Yes Historical Provider, MD   lithium 300 MG capsule Take 2 capsules by mouth 2 times daily   Yes Historical Provider, MD   nicotine (NICODERM CQ) 21 MG/24HR Place 1 patch onto the skin every 24 hours   Yes Historical Provider, MD   acetaminophen (TYLENOL) 325 MG tablet Take 2 tablets by mouth every 6 hours as needed for Pain (headache)   Yes Historical Provider, MD   aluminum & magnesium hydroxide-simethicone (MAALOX) 200-200-20 MG/5ML SUSP suspension Take 30 mLs by mouth every 4 hours as needed for Indigestion   Yes Historical Provider, MD   insulin lispro (HUMALOG) 100 UNIT/ML injection vial Inject sliding scale insulin lispro SC four times daily (before meals and at bedtime) as needed per sliding scale instructions below.   BG : 0 units  200-249: 2 units  250-299: 4 units  300-349: 6 units  350-399: 8 units  > or = 400: 10 units   Yes Historical Provider, MD   risperiDONE (RISPERDAL) 3 MG tablet Take 1 tablet by mouth every evening   Yes Historical Provider, MD   hydrOXYzine pamoate (VISTARIL) 50 MG capsule Take 1 capsule by mouth 3 times daily as needed for Anxiety   Yes Historical Provider, MD       Allergies:  Olanzapine, Clozapine [clozapine], Depakote [divalproex sodium], Pregabalin, Seroquel [quetiapine fumarate], Ultram [tramadol hcl], Baclofen, Byetta 10 mcg pen [exenatide], Cephalexin, Cymbalta [duloxetine hcl], Imitrex [sumatriptan], Naproxen, Pcn [penicillins], Prozac [fluoxetine hcl], and Victoza [liraglutide]    Family History:  family history includes Cancer in her maternal grandmother; Diabetes in her father, maternal aunt,

## 2023-05-05 VITALS
BODY MASS INDEX: 42.53 KG/M2 | HEART RATE: 59 BPM | TEMPERATURE: 98.5 F | RESPIRATION RATE: 14 BRPM | OXYGEN SATURATION: 95 % | HEIGHT: 64 IN | SYSTOLIC BLOOD PRESSURE: 139 MMHG | DIASTOLIC BLOOD PRESSURE: 83 MMHG | WEIGHT: 249.12 LBS

## 2023-05-05 PROBLEM — F25.0 SCHIZOAFFECTIVE DISORDER, BIPOLAR TYPE (HCC): Status: ACTIVE | Noted: 2023-05-05

## 2023-05-05 LAB
ALBUMIN SERPL-MCNC: 3.4 G/DL (ref 3.4–5)
ALBUMIN/GLOB SERPL: 1.3 {RATIO} (ref 1.1–2.2)
ALP SERPL-CCNC: 67 U/L (ref 40–129)
ALT SERPL-CCNC: 8 U/L (ref 10–40)
AMPHETAMINES UR QL SCN>1000 NG/ML: NORMAL
ANION GAP SERPL CALCULATED.3IONS-SCNC: 9 MMOL/L (ref 3–16)
AST SERPL-CCNC: 11 U/L (ref 15–37)
BARBITURATES UR QL SCN>200 NG/ML: NORMAL
BASOPHILS # BLD: 0.1 K/UL (ref 0–0.2)
BASOPHILS NFR BLD: 0.7 %
BENZODIAZ UR QL SCN>200 NG/ML: NORMAL
BILIRUB SERPL-MCNC: 0.4 MG/DL (ref 0–1)
BUN SERPL-MCNC: 6 MG/DL (ref 7–20)
CALCIUM SERPL-MCNC: 9.7 MG/DL (ref 8.3–10.6)
CANNABINOIDS UR QL SCN>50 NG/ML: NORMAL
CHLORIDE SERPL-SCNC: 103 MMOL/L (ref 99–110)
CO2 SERPL-SCNC: 23 MMOL/L (ref 21–32)
COCAINE UR QL SCN: NORMAL
CREAT SERPL-MCNC: 0.6 MG/DL (ref 0.6–1.1)
DEPRECATED RDW RBC AUTO: 13.5 % (ref 12.4–15.4)
DRUG SCREEN COMMENT UR-IMP: NORMAL
EKG ATRIAL RATE: 65 BPM
EKG DIAGNOSIS: NORMAL
EKG P AXIS: 46 DEGREES
EKG P-R INTERVAL: 190 MS
EKG Q-T INTERVAL: 462 MS
EKG QRS DURATION: 90 MS
EKG QTC CALCULATION (BAZETT): 480 MS
EKG R AXIS: 36 DEGREES
EKG T AXIS: 61 DEGREES
EKG VENTRICULAR RATE: 65 BPM
EOSINOPHIL # BLD: 0.5 K/UL (ref 0–0.6)
EOSINOPHIL NFR BLD: 5.5 %
FENTANYL SCREEN, URINE: NORMAL
FLUAV RNA RESP QL NAA+PROBE: NOT DETECTED
FLUBV RNA RESP QL NAA+PROBE: NOT DETECTED
GFR SERPLBLD CREATININE-BSD FMLA CKD-EPI: >60 ML/MIN/{1.73_M2}
GLUCOSE BLD-MCNC: 152 MG/DL (ref 70–99)
GLUCOSE BLD-MCNC: 162 MG/DL (ref 70–99)
GLUCOSE BLD-MCNC: 186 MG/DL (ref 70–99)
GLUCOSE BLD-MCNC: 219 MG/DL (ref 70–99)
GLUCOSE BLD-MCNC: 272 MG/DL (ref 70–99)
GLUCOSE SERPL-MCNC: 158 MG/DL (ref 70–99)
HCT VFR BLD AUTO: 39.2 % (ref 36–48)
HGB BLD-MCNC: 13.1 G/DL (ref 12–16)
LITHIUM DOSE: NORMAL MG
LITHIUM SERPL-MCNC: 0.9 MMOL/L (ref 0.6–1.2)
LITHIUM SERPL-MCNC: 1 MMOL/L (ref 0.6–1.2)
LITHIUM SERPL-MCNC: 1 MMOL/L (ref 0.6–1.2)
LYMPHOCYTES # BLD: 1.5 K/UL (ref 1–5.1)
LYMPHOCYTES NFR BLD: 17 %
MAGNESIUM SERPL-MCNC: 1.4 MG/DL (ref 1.8–2.4)
MCH RBC QN AUTO: 31.7 PG (ref 26–34)
MCHC RBC AUTO-ENTMCNC: 33.5 G/DL (ref 31–36)
MCV RBC AUTO: 94.6 FL (ref 80–100)
METHADONE UR QL SCN>300 NG/ML: NORMAL
MONOCYTES # BLD: 0.7 K/UL (ref 0–1.3)
MONOCYTES NFR BLD: 8.2 %
NEUTROPHILS # BLD: 6.2 K/UL (ref 1.7–7.7)
NEUTROPHILS NFR BLD: 68.6 %
OPIATES UR QL SCN>300 NG/ML: NORMAL
OXYCODONE UR QL SCN: NORMAL
PCP UR QL SCN>25 NG/ML: NORMAL
PERFORMED ON: ABNORMAL
PH UR STRIP: 7.5 [PH]
PHOSPHATE SERPL-MCNC: 2.7 MG/DL (ref 2.5–4.9)
PLATELET # BLD AUTO: 145 K/UL (ref 135–450)
PMV BLD AUTO: 9 FL (ref 5–10.5)
POTASSIUM SERPL-SCNC: 4.1 MMOL/L (ref 3.5–5.1)
PROT SERPL-MCNC: 6.1 G/DL (ref 6.4–8.2)
RBC # BLD AUTO: 4.14 M/UL (ref 4–5.2)
SARS-COV-2 RNA RESP QL NAA+PROBE: NOT DETECTED
SODIUM SERPL-SCNC: 135 MMOL/L (ref 136–145)
WBC # BLD AUTO: 9 K/UL (ref 4–11)

## 2023-05-05 PROCEDURE — 6360000002 HC RX W HCPCS

## 2023-05-05 PROCEDURE — 83735 ASSAY OF MAGNESIUM: CPT

## 2023-05-05 PROCEDURE — 85025 COMPLETE CBC W/AUTO DIFF WBC: CPT

## 2023-05-05 PROCEDURE — 36592 COLLECT BLOOD FROM PICC: CPT

## 2023-05-05 PROCEDURE — 93005 ELECTROCARDIOGRAM TRACING: CPT | Performed by: NURSE PRACTITIONER

## 2023-05-05 PROCEDURE — 36415 COLL VENOUS BLD VENIPUNCTURE: CPT

## 2023-05-05 PROCEDURE — 2580000003 HC RX 258

## 2023-05-05 PROCEDURE — 6370000000 HC RX 637 (ALT 250 FOR IP)

## 2023-05-05 PROCEDURE — C9113 INJ PANTOPRAZOLE SODIUM, VIA: HCPCS

## 2023-05-05 PROCEDURE — 80307 DRUG TEST PRSMV CHEM ANLYZR: CPT

## 2023-05-05 PROCEDURE — 87636 SARSCOV2 & INF A&B AMP PRB: CPT

## 2023-05-05 PROCEDURE — 84100 ASSAY OF PHOSPHORUS: CPT

## 2023-05-05 PROCEDURE — 80178 ASSAY OF LITHIUM: CPT

## 2023-05-05 PROCEDURE — 80053 COMPREHEN METABOLIC PANEL: CPT

## 2023-05-05 PROCEDURE — 2060000000 HC ICU INTERMEDIATE R&B

## 2023-05-05 PROCEDURE — 92526 ORAL FUNCTION THERAPY: CPT

## 2023-05-05 RX ORDER — MAGNESIUM SULFATE IN WATER 40 MG/ML
2000 INJECTION, SOLUTION INTRAVENOUS ONCE
Status: COMPLETED | OUTPATIENT
Start: 2023-05-05 | End: 2023-05-05

## 2023-05-05 RX ORDER — QUETIAPINE FUMARATE 200 MG/1
400 TABLET, FILM COATED ORAL NIGHTLY
Status: DISCONTINUED | OUTPATIENT
Start: 2023-05-05 | End: 2023-05-06 | Stop reason: HOSPADM

## 2023-05-05 RX ORDER — RISPERIDONE 2 MG/1
2 TABLET ORAL NIGHTLY
Qty: 60 TABLET | Refills: 3 | Status: ON HOLD | OUTPATIENT
Start: 2023-05-05 | End: 2023-05-11 | Stop reason: SDUPTHER

## 2023-05-05 RX ORDER — RISPERIDONE 2 MG/1
2 TABLET ORAL NIGHTLY
Status: DISCONTINUED | OUTPATIENT
Start: 2023-05-05 | End: 2023-05-06 | Stop reason: HOSPADM

## 2023-05-05 RX ORDER — ESCITALOPRAM OXALATE 10 MG/1
10 TABLET ORAL DAILY
Status: DISCONTINUED | OUTPATIENT
Start: 2023-05-05 | End: 2023-05-06 | Stop reason: HOSPADM

## 2023-05-05 RX ORDER — OXCARBAZEPINE 300 MG/1
300 TABLET, FILM COATED ORAL 2 TIMES DAILY
Qty: 60 TABLET | Refills: 3 | Status: ON HOLD | OUTPATIENT
Start: 2023-05-05 | End: 2023-05-11 | Stop reason: HOSPADM

## 2023-05-05 RX ORDER — OXCARBAZEPINE 150 MG/1
300 TABLET, FILM COATED ORAL 2 TIMES DAILY
Status: DISCONTINUED | OUTPATIENT
Start: 2023-05-05 | End: 2023-05-06 | Stop reason: HOSPADM

## 2023-05-05 RX ORDER — RISPERIDONE 1 MG/1
1 TABLET ORAL DAILY
Status: DISCONTINUED | OUTPATIENT
Start: 2023-05-05 | End: 2023-05-06 | Stop reason: HOSPADM

## 2023-05-05 RX ORDER — QUETIAPINE FUMARATE 400 MG/1
400 TABLET, FILM COATED ORAL NIGHTLY
Qty: 60 TABLET | Refills: 3 | Status: ON HOLD | OUTPATIENT
Start: 2023-05-05 | End: 2023-05-11 | Stop reason: HOSPADM

## 2023-05-05 RX ORDER — RISPERIDONE 1 MG/1
1 TABLET ORAL DAILY
Qty: 60 TABLET | Refills: 3 | Status: ON HOLD | OUTPATIENT
Start: 2023-05-05 | End: 2023-05-11 | Stop reason: HOSPADM

## 2023-05-05 RX ORDER — NITROGLYCERIN 0.4 MG/1
0.4 TABLET SUBLINGUAL EVERY 5 MIN PRN
Status: DISCONTINUED | OUTPATIENT
Start: 2023-05-05 | End: 2023-05-06 | Stop reason: HOSPADM

## 2023-05-05 RX ORDER — BUPRENORPHINE HYDROCHLORIDE AND NALOXONE HYDROCHLORIDE DIHYDRATE 2; .5 MG/1; MG/1
2 TABLET SUBLINGUAL 2 TIMES DAILY
Status: DISCONTINUED | OUTPATIENT
Start: 2023-05-05 | End: 2023-05-06 | Stop reason: HOSPADM

## 2023-05-05 RX ADMIN — HEPARIN SODIUM 5000 UNITS: 5000 INJECTION INTRAVENOUS; SUBCUTANEOUS at 13:57

## 2023-05-05 RX ADMIN — MAGNESIUM SULFATE HEPTAHYDRATE 2000 MG: 40 INJECTION, SOLUTION INTRAVENOUS at 08:52

## 2023-05-05 RX ADMIN — HEPARIN SODIUM 5000 UNITS: 5000 INJECTION INTRAVENOUS; SUBCUTANEOUS at 05:44

## 2023-05-05 RX ADMIN — SODIUM CHLORIDE, PRESERVATIVE FREE 10 ML: 5 INJECTION INTRAVENOUS at 08:51

## 2023-05-05 RX ADMIN — PANTOPRAZOLE SODIUM 40 MG: 40 INJECTION, POWDER, FOR SOLUTION INTRAVENOUS at 08:51

## 2023-05-05 NOTE — BH NOTE
Psychiatry Follow-Up Consultation    Patient Name: Jeffry Torres  MRN: [id number]  Admission Date: 5/3/2023    Reason for Consult: PMH of borderline personality disorder, suicidal ideation, came in with lithium toxicity, suspected suicidal ideation on this admission; from inpatient psych    Patient's chart was reviewed, case was discussed with nursing/OT/RT staff, and collaborated with  about the treatment plan. Chart review indicates that patient told staff that she intentionally tried to overdose on Lithium prior to her admission to [facility]. Noted to be sad, withdrawn, tearful at times. Attempted to call [facility] for collateral. No answer. Paper chart reviewed - it appears she was admitted to [facility] on 04/24/2023. Unclear how she had access to her [place] to overdose. Psychiatric medications from [facility] include the following:  Lexapro 10 mg daily  Risperdal 1 mg daily  Lithium 600 mg BID  Suboxone 4-1 mg BID  Risperdal 2 mg HS   Seroquel 400 mg HS     Met with Gita. She states she intentionally overdosed on her home Lithium. When asked about intent, she states \"do you really need to ask that? \". She states today that she wishes the overdose would have been successful and that she did not wake up. Became tearful when talking about this. She states she does not recall being at [facility] prior to her current admission but notes memory impairment since she overdosed. Unable to recall names or doses of home medications; fills at [facility]. Reports that she was released from retirement 5 months ago and moved into a penitentiary house, \"and I hate it there\".      ROS: Patient has new complaints: No    Current Medications Ordered:   heparin (porcine)  5,000 Units SubCUTAneous 3 times per day    nicotine  1 patch TransDERmal Q24H    sodium chloride flush  5-40 mL IntraVENous 2 times per day    pantoprazole  40 mg IntraVENous Daily    insulin lispro  0-4 Units SubCUTAneous TID WC    insulin lispro  0-4 Units SubCUTAneous Nightly

## 2023-05-06 ENCOUNTER — HOSPITAL ENCOUNTER (INPATIENT)
Age: 48
LOS: 5 days | Discharge: HOME | DRG: 883 | End: 2023-05-11
Attending: PSYCHIATRY & NEUROLOGY | Admitting: PSYCHIATRY & NEUROLOGY
Payer: COMMERCIAL

## 2023-05-06 LAB
EKG ATRIAL RATE: 56 BPM
EKG DIAGNOSIS: NORMAL
EKG P AXIS: 43 DEGREES
EKG P-R INTERVAL: 200 MS
EKG Q-T INTERVAL: 448 MS
EKG QRS DURATION: 90 MS
EKG QTC CALCULATION (BAZETT): 432 MS
EKG R AXIS: 8 DEGREES
EKG T AXIS: 21 DEGREES
EKG VENTRICULAR RATE: 56 BPM
GLUCOSE BLD-MCNC: 146 MG/DL (ref 70–99)
GLUCOSE BLD-MCNC: 156 MG/DL (ref 70–99)
GLUCOSE BLD-MCNC: 162 MG/DL (ref 70–99)
GLUCOSE BLD-MCNC: 215 MG/DL (ref 70–99)
PERFORMED ON: ABNORMAL

## 2023-05-06 PROCEDURE — 93010 ELECTROCARDIOGRAM REPORT: CPT | Performed by: INTERNAL MEDICINE

## 2023-05-06 PROCEDURE — 1240000000 HC EMOTIONAL WELLNESS R&B

## 2023-05-06 PROCEDURE — 6370000000 HC RX 637 (ALT 250 FOR IP)

## 2023-05-06 RX ORDER — HYDROXYZINE 50 MG/1
50 TABLET, FILM COATED ORAL 3 TIMES DAILY PRN
Status: DISCONTINUED | OUTPATIENT
Start: 2023-05-06 | End: 2023-05-11 | Stop reason: HOSPADM

## 2023-05-06 RX ORDER — INSULIN LISPRO 100 [IU]/ML
0-8 INJECTION, SOLUTION INTRAVENOUS; SUBCUTANEOUS
Status: DISCONTINUED | OUTPATIENT
Start: 2023-05-06 | End: 2023-05-11 | Stop reason: HOSPADM

## 2023-05-06 RX ORDER — FENOFIBRATE 160 MG/1
160 TABLET ORAL DAILY
Status: DISCONTINUED | OUTPATIENT
Start: 2023-05-06 | End: 2023-05-06

## 2023-05-06 RX ORDER — TRAZODONE HYDROCHLORIDE 50 MG/1
50 TABLET ORAL NIGHTLY PRN
Status: DISCONTINUED | OUTPATIENT
Start: 2023-05-06 | End: 2023-05-10

## 2023-05-06 RX ORDER — RISPERIDONE 1 MG/1
1 TABLET ORAL DAILY
Status: DISCONTINUED | OUTPATIENT
Start: 2023-05-06 | End: 2023-05-11 | Stop reason: HOSPADM

## 2023-05-06 RX ORDER — MAGNESIUM HYDROXIDE/ALUMINUM HYDROXICE/SIMETHICONE 120; 1200; 1200 MG/30ML; MG/30ML; MG/30ML
30 SUSPENSION ORAL EVERY 6 HOURS PRN
Status: DISCONTINUED | OUTPATIENT
Start: 2023-05-06 | End: 2023-05-11 | Stop reason: HOSPADM

## 2023-05-06 RX ORDER — LEVOTHYROXINE SODIUM 0.03 MG/1
25 TABLET ORAL DAILY
Status: DISCONTINUED | OUTPATIENT
Start: 2023-05-06 | End: 2023-05-11 | Stop reason: HOSPADM

## 2023-05-06 RX ORDER — OXCARBAZEPINE 150 MG/1
150 TABLET, FILM COATED ORAL 2 TIMES DAILY
Status: DISCONTINUED | OUTPATIENT
Start: 2023-05-06 | End: 2023-05-11 | Stop reason: HOSPADM

## 2023-05-06 RX ORDER — RISPERIDONE 2 MG/1
2 TABLET ORAL NIGHTLY
Status: DISCONTINUED | OUTPATIENT
Start: 2023-05-06 | End: 2023-05-11 | Stop reason: HOSPADM

## 2023-05-06 RX ORDER — QUETIAPINE FUMARATE 200 MG/1
200 TABLET, FILM COATED ORAL NIGHTLY
Status: DISCONTINUED | OUTPATIENT
Start: 2023-05-06 | End: 2023-05-11 | Stop reason: HOSPADM

## 2023-05-06 RX ORDER — ACETAMINOPHEN 325 MG/1
650 TABLET ORAL EVERY 4 HOURS PRN
Status: DISCONTINUED | OUTPATIENT
Start: 2023-05-06 | End: 2023-05-11 | Stop reason: HOSPADM

## 2023-05-06 RX ORDER — HALOPERIDOL 5 MG/1
5 TABLET ORAL EVERY 4 HOURS PRN
Status: DISCONTINUED | OUTPATIENT
Start: 2023-05-06 | End: 2023-05-11 | Stop reason: HOSPADM

## 2023-05-06 RX ORDER — DIPHENHYDRAMINE HYDROCHLORIDE 50 MG/ML
50 INJECTION INTRAMUSCULAR; INTRAVENOUS EVERY 4 HOURS PRN
Status: DISCONTINUED | OUTPATIENT
Start: 2023-05-06 | End: 2023-05-11 | Stop reason: HOSPADM

## 2023-05-06 RX ORDER — INSULIN LISPRO 100 [IU]/ML
0-4 INJECTION, SOLUTION INTRAVENOUS; SUBCUTANEOUS NIGHTLY
Status: DISCONTINUED | OUTPATIENT
Start: 2023-05-06 | End: 2023-05-11 | Stop reason: HOSPADM

## 2023-05-06 RX ORDER — ESCITALOPRAM OXALATE 10 MG/1
10 TABLET ORAL DAILY
Status: DISCONTINUED | OUTPATIENT
Start: 2023-05-06 | End: 2023-05-11 | Stop reason: HOSPADM

## 2023-05-06 RX ORDER — OXCARBAZEPINE 300 MG/1
300 TABLET, FILM COATED ORAL 2 TIMES DAILY
Status: DISCONTINUED | OUTPATIENT
Start: 2023-05-06 | End: 2023-05-06

## 2023-05-06 RX ORDER — HALOPERIDOL 5 MG/ML
5 INJECTION INTRAMUSCULAR EVERY 4 HOURS PRN
Status: DISCONTINUED | OUTPATIENT
Start: 2023-05-06 | End: 2023-05-11 | Stop reason: HOSPADM

## 2023-05-06 RX ORDER — QUETIAPINE FUMARATE 200 MG/1
400 TABLET, FILM COATED ORAL NIGHTLY
Status: DISCONTINUED | OUTPATIENT
Start: 2023-05-06 | End: 2023-05-06

## 2023-05-06 RX ORDER — POLYETHYLENE GLYCOL 3350 17 G
2 POWDER IN PACKET (EA) ORAL
Status: DISCONTINUED | OUTPATIENT
Start: 2023-05-06 | End: 2023-05-11 | Stop reason: HOSPADM

## 2023-05-06 RX ADMIN — QUETIAPINE FUMARATE 400 MG: 200 TABLET ORAL at 01:50

## 2023-05-06 RX ADMIN — ESCITALOPRAM 10 MG: 10 TABLET, FILM COATED ORAL at 10:14

## 2023-05-06 RX ADMIN — LEVOTHYROXINE SODIUM 25 MCG: 25 TABLET ORAL at 07:24

## 2023-05-06 RX ADMIN — RISPERIDONE 1 MG: 1 TABLET, FILM COATED ORAL at 10:14

## 2023-05-06 RX ADMIN — QUETIAPINE FUMARATE 200 MG: 200 TABLET ORAL at 20:39

## 2023-05-06 RX ADMIN — RISPERIDONE 2 MG: 2 TABLET ORAL at 01:50

## 2023-05-06 RX ADMIN — OXCARBAZEPINE 300 MG: 300 TABLET, FILM COATED ORAL at 01:49

## 2023-05-06 RX ADMIN — OXCARBAZEPINE 300 MG: 300 TABLET, FILM COATED ORAL at 10:14

## 2023-05-06 RX ADMIN — FENOFIBRATE 160 MG: 160 TABLET ORAL at 10:14

## 2023-05-06 RX ADMIN — RISPERIDONE 2 MG: 2 TABLET ORAL at 20:39

## 2023-05-06 RX ADMIN — OXCARBAZEPINE 150 MG: 150 TABLET, FILM COATED ORAL at 20:39

## 2023-05-06 ASSESSMENT — LIFESTYLE VARIABLES
HOW OFTEN DO YOU HAVE A DRINK CONTAINING ALCOHOL: NEVER
HOW MANY STANDARD DRINKS CONTAINING ALCOHOL DO YOU HAVE ON A TYPICAL DAY: PATIENT DOES NOT DRINK

## 2023-05-06 ASSESSMENT — SLEEP AND FATIGUE QUESTIONNAIRES
SLEEP PATTERN: DIFFICULTY FALLING ASLEEP;NIGHTMARES/TERRORS
DO YOU HAVE DIFFICULTY SLEEPING: YES
DO YOU USE A SLEEP AID: YES

## 2023-05-06 ASSESSMENT — PATIENT HEALTH QUESTIONNAIRE - PHQ9
1. LITTLE INTEREST OR PLEASURE IN DOING THINGS: 1
2. FEELING DOWN, DEPRESSED OR HOPELESS: 1
SUM OF ALL RESPONSES TO PHQ9 QUESTIONS 1 & 2: 2
SUM OF ALL RESPONSES TO PHQ QUESTIONS 1-9: 2

## 2023-05-06 NOTE — PROGRESS NOTES
At [phone] this RN was notified of pt's bs being at 61. MD notified. Pt was given orange juice and apple juice (240mL) at 0447.
Contacted by Maurice Ortiz, upon arrival of transport team patient complained of chest pain . I personally went to bedside to evaluate patient. She is sitting up in bed A & Ox4, not acute distress, she states \" my chest felt tight then it stopped\"  she denies any nausea, vomiting, diaphoresis, no radiation of pain. On exam she states symptoms are gone, she also stated she felt anxious of when she was being transferred. EKG obtained personally reviewed, demonstrated Sinus rhythm HR 56, no ischemic changes. Similar to prior.    Yudy SHEPPARD  Internal Medicine Hospitalist   Acute Care [facility]  5/5/2023  11:52 PM
ICU Progress Note  PGY-1    PCP: Celina Lizarraga    Code:Full Code  Admit Date: 5/3/2023  Vent Settings:    / / /   Diet: ADULT DIET; Dysphagia - Soft and Bite Sized; 3 carb choices (45 gm/meal)    Interval History  Patient was seen and examined at bedside. NAEO  Patient did endorse this morning that she overdosed on the Lithium in an attempt to end her life. We do not know for sure how she got access to the medication. She is however stable hemodynamically, with serum Lithium levels WNL. Afebrile and saturating well on RA. History of present illness: This is a 56yo F with Pmhx of severe borderline personality disorder, bipolar disorder, DM2, HLD, morbid obesity who presents with lethargy. Patient lives at [facility]. Per staff there, patient was increasingly lethargic and difficult to arouse over the last 2-3 days. She was sleeping all day and would \"sleep while standing up. \" When staff would give her meals or drinks, she would not be awake enough to eat or drink. She is on lithium, risperidone, seroquel, escitalopram at home. Additionally, she is on lisinopril and hydroxyzine. These medications have been held since 4/30 when the lethargy started. No recent medications changes or new medications. Today, patient is able to be aroused by voice, however she falls asleep very quickly. She states she has not been eating or drinking very well. In the ED, patient was hypotensive to 80s/40s with improvement to 100s/60s after 2L IVF boluses. Labs significant for NERY with Cr 2.1, BUN 39, hyponatremia 133, leukocytosis to 14.8. Her lithium level was elevated to 3.7. UA with trace ketones. VBG 7.288, pCO2 52.1, HCO3 24.9. Chest XR with low lung volumes and mild pulmonary vascular congestion. Poison control contacted and they recommend continued hydration, Q4hr lithium checks. Nephrology contacted ASAP and they will start patient on dialysis.  General surgery consulted for vas
Jeffries catheter removed at 0835 per nurse driven protocol. Will continue to monitor.
Morning rounds occurred with  and medical residents, plan of care discussed. Will continue to monitor.
Nephrology Progress Note  [phone]   [facility] [employer]        Reason for Consult:  Lithium toxicity     HISTORY OF PRESENT ILLNESS:                This is a patient with significant past medical history of bipolar illness on lithium  who presents with encephalopathy ,lithium toxicity and NERY she is in ECF and meds are reviewed as per pharmacist, patient is also on Lisinopril. She has an inital lithium level of 3.7, Cr of 2.1 with AND she was in [facility] AT [facility] ED in feb 2023 her Cr was normal and Li level was 1.2 ,she has been in ECF due to suicidal ideation. HPI: No complaints. No pain. Review of Systems:  No chest pain, no shortness of breath. SHx: No visitors this morning. PHYSICAL EXAM:    Vitals:    /72   Pulse 60   Temp 98 °F (36.7 °C) (Oral)   Resp 20   Ht 5' 4\" (1.626 m)   Wt 249 lb 1.9 oz (113 kg)   LMP  (LMP Unknown)   SpO2 93%   BMI 42.76 kg/m²   I/O last 3 completed shifts: In: 710 [P.O.:660; I.V.:50]  Out: 2250 [Urine:2250]  No intake/output data recorded. Physical Exam:  Gen: Resting in bed, NAD. In the ICU. Sitter at the bedside. HEENT: MMM, OP clear. CV: RRR no m/r/g. No S3.  Lungs: Good respiratory effort, clear air entry   Abd: S/NT +BS  Ext: No edema, no cyanosis  Skin: Warm. No rashes appreciated. : No TTP over bladder, nondistended. Neuro: Alert. Oriented to self.   Access: Right IJ temp HD catheter c/d/I.  : +richardson    DATA:    CBC:   Lab Results   Component Value Date/Time    WBC 9.0 05/05/2023 05:31 AM    RBC 4.14 05/05/2023 05:31 AM    HGB 13.1 05/05/2023 05:31 AM    HCT 39.2 05/05/2023 05:31 AM    MCV 94.6 05/05/2023 05:31 AM    MCH 31.7 05/05/2023 05:31 AM    MCHC 33.5 05/05/2023 05:31 AM    RDW 13.5 05/05/2023 05:31 AM     05/05/2023 05:31 AM    MPV 9.0 05/05/2023 05:31 AM     BMP:    Lab Results   Component Value Date/Time     05/05/2023 05:31 AM    K 4.1 05/05/2023 05:31 AM     05/05/2023 05:31 AM    CO2 23 05/05/2023 05:31
Nephrology Progress Note  [phone]   [facility]        Reason for Consult:  Lithium toxicity     HISTORY OF PRESENT ILLNESS:                This is a patient with significant past medical history of bipolar illness on lithium  who presents with encephalopathy ,lithium toxicity and NERY she is in ECF and meds are reviewed as per pharmacist, patient is also on Lisinopril. She has an inital lithium level of 3.7, Cr of 2.1 with AND she was in [facility] AT [facility] ED in feb 2023 her Cr was normal and Li level was 1.2 ,she has been in ECF due to suicidal ideation. She is somnolent and and able to answer  some questions  today but somewhat confused to recent events. Review of Systems:  a comprehensive Review of systems was unobtainable due to patient encephalopathy     PHYSICAL EXAM:    Vitals:    /67   Pulse 63   Temp 98.5 °F (36.9 °C) (Oral)   Resp 18   Ht 5' 4\" (1.626 m)   Wt 249 lb 1.9 oz (113 kg)   LMP  (LMP Unknown)   SpO2 98%   BMI 42.76 kg/m²   I/O last 3 completed shifts: In: 1450 [I.V.:450; IV Piggyback:1000]  Out: 1500 [Urine:1500]  No intake/output data recorded. Physical Exam:  Gen: Resting in bed, NAD. HEENT: MMM, OP clear. CV: RRR no m/r/g. No S3.  Lungs: Good respiratory effort, clear air entry   Abd: S/NT +BS  Ext: No edema, no cyanosis  Skin: Warm. No rashes appreciated. : No TTP over bladder, nondistended. Neuro: somnolent confused   Joints: No erythema noted over joints.     DATA:    CBC:   Lab Results   Component Value Date/Time    WBC 10.4 05/04/2023 05:18 AM    RBC 4.17 05/04/2023 05:18 AM    HGB 13.2 05/04/2023 05:18 AM    HCT 39.4 05/04/2023 05:18 AM    MCV 94.5 05/04/2023 05:18 AM    MCH 31.5 05/04/2023 05:18 AM    MCHC 33.3 05/04/2023 05:18 AM    RDW 13.9 05/04/2023 05:18 AM     05/04/2023 05:18 AM    MPV 8.8 05/04/2023 05:18 AM     BMP:    Lab Results   Component Value Date/Time     05/04/2023 05:17 AM    K 3.8 05/04/2023 05:17 AM    
Patient admitted that she intentionally tried to overdose on [place] while at [facility]. MD notified.  Sitter at bedside for monitoring
Physician Progress Note      PATIENT:               Kevin Hall  CSN #:                  [id number]  :                       1975  ADMIT DATE:       5/3/2023 9:41 AM  DISCH DATE:  Chris Hernandez  PROVIDER #:        Bela Gruber TEXT:    Pt admitted with Lithium toxicity and has encephalopathy documented. If   possible, please document in progress notes and discharge summary further   specificity regarding the type of encephalopathy:    The medical record reflects the following:  Risk Factors: 54 y/o female  Clinical Indicators: 5/3 PN: \"PMHx of bipolar illness on lithium who presents   with encephalopathy, lithium toxicity and NERY. \"  Treatment:  surgery consult, nephrology consult, Line insertion for   anticipated Dialysis, lab monitoring, 2L IVF boluses, Q4hr lithium checks,   Poison control contacted  Options provided:  -- Toxic metabolic encephalopathy due to Lithium toxicity and NERY  -- Toxic metabolic encephalopathy due to Lithium toxicity and NERY  -- Metabolic encephalopathy due to NERY  -- Drug-induced encephalopathy due to Lithium toxicity  -- Other - I will add my own diagnosis  -- Disagree - Not applicable / Not valid  -- Disagree - Clinically unable to determine / Unknown  -- Refer to Clinical Documentation Reviewer    PROVIDER RESPONSE TEXT:    This patient has toxic metabolic encephalopathy due to Lithium toxicity and   NERY.     Query created by: Bhavana Delgado on 2023 9:21 AM      Electronically signed by:  Maricel Cueto 2023 11:49 AM
Pt BS was 80. 30min after drinking the apple juice and orange juice.
Pt explained the importance of taking her meds, pt refused, this RN encouraged to take medications. Pt refused. Will continue to monitor.
Pt seen for shift handoff. A&Ox4. VSS. Pt is ready to go to [place], says it will be ggod for her to go because she'll be closer to home and she has family there. Plan for transport to come at [phone] for transport, report called to [facility]. Pt sleeping in bed with sitter at bedside, will continue to monitor.
Pt was complaining of right sided chest pain. Pt had several beats of bigemini and had a brief period of desat-ing. Pt stated, \"this is like the pain I had the last time I had a heart attack\". This RN notified MD. See results for completion of EKG and Labs.
RN screened patient's swallowing by administering the [facility] Protocol. The patient consumed 3 ounces of water by straw in sequential swallows without signs/symptoms of aspiration.
Sitter at bedside got pt up to bathroom, and called this RN to say she pulled out her IV. Pt has discharge order for inpatient psych. Will continue to monitor.
Speech Language Pathology  Facility/Department:[facility] ICU  dysphagia Evaluation    Name: Leandro Garcia  : 1975  MRN: [id number]                                                     Patient Diagnosis(es):   Patient Active Problem List    Diagnosis Date Noted    Lithium toxicity, accidental or unintentional, initial encounter 2023    Hypotension 2023    Altered mental status     Lithium overdose     Toxic metabolic encephalopathy     Substance abuse ([address] 11/15/2017    Lithium toxicity 11/15/2017    High anion gap metabolic acidosis     Elevated lactic acid level 11/15/2017    Cocaine abuse ([address] 11/15/2017    PCP abuse ([address] 11/15/2017    NERY (acute kidney injury) ([address] 11/15/2017    Elevated LFTs 11/15/2017    Leukocytosis 11/15/2017    Sepsis ([address] 11/15/2017    Hyponatremia 11/15/2017    Acute hypoxemic respiratory failure ([address]     Shock ([address]     MDD (major depressive disorder), recurrent severe, without psychosis ([address] 2017    DM (diabetes mellitus) type II uncontrolled, periph vascular disorder 2014    Accidental drug overdose 2013    Borderline personality disorder ([address] 2012     Past Medical History:   Diagnosis Date    Arthritis     Asthma     Bipolar disorder ([address]     Borderline personality disorder ([address]     CAP (community acquired pneumonia) 2015    Chronic kidney disease     proteinuria    Depression     Diabetes mellitus ([address]     Drug abuse ([address]     GERD (gastroesophageal reflux disease)     Headache     Hyperlipidemia     Influenza A 14    Morbid obesity ([address]     Narcotic addiction ([address]     Pain management     Pseudotumor cerebri     dx 3/12 by neurologist. OP [id number]    Sleep apnea     Suicide ideation     chronic    Wears glasses      Past Surgical History:   Procedure Laterality Date    CHOLECYSTECTOMY      COLONOSCOPY      17 YRS AGO ,NORMAL    CYSTOSCOPY      CYSTOSCOPY  
Treatment time: 4 hours  Net UF: 0 ml     Pre weight: 108 kg  Post weight:108 kg      Access used: R. Neck CVC  Access function: Good with  ml/min until 2hrs into tx, dialysis machine line clotted, new set up applied, Patient received full tx. Medications or blood products given: No        Summary of response to treatment: Patient tolerated treatment well and without any complications. Patient remained stable throughout entire treatment. Patient became more alert throughout treatment. Per patient she was taking Lithium medication as prescribed and unaware of overdose or darius levels. Patient is reporting to go home. Client is cooperative and with appropriate conversation. Floor RN and sitter at bedside. Report given to assigned, RN and copy of dialysis treatment record placed in chart, to be scanned into EMR.
V2.0    [facility] Progress Note      Name:  Melba Rawls /Age/Sex: 1975  (53 y.o. female)   MRN & CSN:  [id number] & [id number] Encounter Date/Time: 2023 11:50 AM EDT   Location:  4506/4506-01 PCP: Jonathan Barros MD       Hospital Day: 3    Assessment and Recommendations   Melba Rawls is a 53 y.o. female with pmh of Bipolar disorder  who presents with Lithium toxicity, accidental or unintentional, initial encounter    Assessment  Principal Problem:    Lithium toxicity, accidental or unintentional, initial encounter  Active Problems:    Toxic metabolic encephalopathy    NERY (acute kidney injury) ([facility]    Hypotension  Resolved Problems:    * No resolved hospital problems. *       Plan:    Lithium level improving after urgent HD. Patient acknowledges intentional consumption of lithium, not sure how she got access of it during her stay   NERY resolved. Mentation overall improved, AAO x 4   Hypotension improved  SLP eval and start diet if paient is able to eat  Hold all antipsychotic meds, use Halodol prn for agitation   Discussed with psychiatry, awaiting on dc medications recommendations     Patient appears medically stable for IP psychiatric admission     Diet ADULT DIET; Dysphagia - Soft and Bite Sized; 3 carb choices (45 gm/meal)   DVT Prophylaxis [] Lovenox, [x]  Heparin, [] SCDs, [] Ambulation,  [] Eliquis, [] Xarelto   Code Status Full Code             Personally reviewed Lab Studies and Imaging     Discussed management of the case with Psychiatry  who recommended continue supportive treatment, antipsychotic PRN     EKG interpreted personally and results NSR             Subjective:       Melba Rawls is a 53 y.o. female who presents with Lithium toxicity  AAO x 5. She acknowledges intentional consumption of lithium       Review of Systems:      Pertinent positives and negatives discussed in HPI    Objective:      Intake/Output Summary (Last 24 hours)
V2.0    [facility] [place]      Name:  Caitlin Krishnan /Age/Sex: 1975  (53 y.o. female)   MRN & CSN:  [id number] & [id number] Encounter Date/Time: 2023 11:50 AM EDT   [facility]  [facility]- PCP: Marysue Najjar, MD       Hospital Day: 2    Assessment and Recommendations   Caitlin Krishnan is a 53 y.o. female with pmh of Bipolar disorder  who presents with Lithium toxicity, accidental or unintentional, initial encounter    Assessment  Principal Problem:    Lithium toxicity, accidental or unintentional, initial encounter  Active Problems:    Toxic metabolic encephalopathy    NERY (acute kidney injury) ([facility]    Hypotension  Resolved Problems:    * No resolved hospital problems. *       Plan:    Lithium level improving after urgent HD  NERY improving  Mentation overall improved, still confused. Hypotension improved  SLP eval and start diet if paient is able to eat  Hold all antipsychotic meds, use Halodol prn for agitation         Diet ADULT DIET; Regular; 3 carb choices (45 gm/meal)   DVT Prophylaxis [] Lovenox, [x]  Heparin, [] SCDs, [] Ambulation,  [] Eliquis, [] Xarelto   Code Status Full Code             Personally reviewed Lab Studies and Imaging     Discussed management of the case with Psychiatry  who recommended continue supportive treatment, antipsychotic PRN     EKG interpreted personally and results NSR             Subjective:       Caitlin Krishnan is a 53 y.o. female who presents with Lithium toxicity  Appears confused  Denies any cp, sob, nausea, vomiting. Review of Systems:      Pertinent positives and negatives discussed in HPI    Objective:      Intake/Output Summary (Last 24 hours) at 2023 1150  Last data filed at 2023 0337  Gross per 24 hour   Intake 1450 ml   Output 1500 ml   Net -50 ml      Vitals:   Vitals:    23 0419 23 0500 23 0650 23 0700   BP:  (!) 102/54 109/64 109/67   Pulse: 68 67 65 63   Resp: 18 21 20
opacities without significant change. XR CHEST PORTABLE   Final Result   1. Low lung volumes with mild pulmonary venous congestion. Date of onset: 5/3/23    Current Diet:  ADULT DIET; Dysphagia - Soft and Bite Sized; 3 carb choices (45 gm/meal)    Treatment Diagnosis: dysphagia    Pain:  None reported or indicated through any means    Prior Dysphagia History:   Pt has remote history of dysphagia evaluation 11/20/17 with MBS being completed - soft /bite sized diet was recommended    Bedside Swallowing Evaluation Impression 5/4/23  Pt alert to voice, requires cues to maintain alertness at times. Pt was oriented to this being a hospital and time. Pt followed simple commands and answered basic questions appropriately. Oral structures grossly intact, no asymmetry noted. Dentition is adequate. Pt produced volitional cough. Pt presented with puree, thin liquids via cup / straw, including 3 ounces of uninterrupted swallows, as well as a janiya cracker. Pt demonstrated no overt signs of aspiration: no coughing/throat clearing or change in vocal quality. Swallow movement noted upon palpation of anterior neck. Adequate labial seal noted with no anterior loss of liquids. Pt exhibited no difficulty with mastication of cracker, no oral residue. Recommend downgrade to soft and bite sized (until mental status improved)/Thin liquids    Treatment:  Dysphagia Goals: The pt will be seen 2-3  x to address the following goals:  1-The patient will tolerate recommended diet without observed clinical signs of aspiration  5/5:  pt maintaining alertness today,  not wanting to eat breakfast, but agreeable to take small amount of PO. Pt consumed thin liquids via cup and straw with no overt signs of aspiration, voice remained clear. Pt provided with hard solid texture and demonstrated adequate mastication with no oral residue. No respiratory decline per chart review or concerns per RN.  Recommend upgrade to regular

## 2023-05-06 NOTE — PLAN OF CARE
Problem: Discharge Planning  Goal: Discharge to home or other facility with appropriate resources  Outcome: Progressing  Flowsheets (Taken 5/3/2023 1600 by Gloria Gonzalez RN)  Discharge to home or other facility with appropriate resources:   Identify barriers to discharge with patient and caregiver   Arrange for needed discharge resources and transportation as appropriate   Identify discharge learning needs (meds, wound care, etc)   Refer to discharge planning if patient needs post-hospital services based on physician order or complex needs related to functional status, cognitive ability or social support system     Problem: Chronic Conditions and Co-morbidities  Goal: Patient's chronic conditions and co-morbidity symptoms are monitored and maintained or improved  Outcome: Progressing  Flowsheets  Taken 5/4/2023 0554 by Nena Garcia 34 - Patient's Chronic Conditions and Co-Morbidity Symptoms are Monitored and Maintained or Improved:   Monitor and assess patient's chronic conditions and comorbid symptoms for stability, deterioration, or improvement   Collaborate with multidisciplinary team to address chronic and comorbid conditions and prevent exacerbation or deterioration   Update acute care plan with appropriate goals if chronic or comorbid symptoms are exacerbated and prevent overall improvement and discharge  Taken 5/3/2023 1600 by Nena Byrnes 34 - Patient's Chronic Conditions and Co-Morbidity Symptoms are Monitored and Maintained or Improved:   Monitor and assess patient's chronic conditions and comorbid symptoms for stability, deterioration, or improvement   Collaborate with multidisciplinary team to address chronic and comorbid conditions and prevent exacerbation or deterioration   Update acute care plan with appropriate goals if chronic or comorbid symptoms are exacerbated and prevent overall improvement and discharge     Problem: Self Harm/Suicidality  Goal: Will have no
Problem: SLP Adult - Impaired Swallowing  Goal: By Discharge: Advance to least restrictive diet without signs or symptoms of aspiration for planned discharge setting. See evaluation for individualized goals.   Outcome: Progressing
Problem: Self Harm/Suicidality  Goal: Will have no self-injury during hospital stay  Description: INTERVENTIONS:  1. Ensure constant observer at bedside with Q15M safety checks  2. Maintain a safe environment  3. Secure patient belongings  4. Ensure family/visitors adhere to safety recommendations  5. Ensure safety tray has been added to patient's diet order  6. Every shift and PRN: Re-assess suicidal risk via Frequent Screener    Outcome: Progressing  Will have no self-injury during hospital stay:   Ensure constant observer at bedside with Q15M safety checks   Maintain a safe environment  Note: Patient has not expressed suicidal ideations this shift but has been sad, withdrawn, tearful at times. Problem: Safety - Adult  Goal: Free from fall injury  Outcome: Progressing  Note: All safety precautions in place. Sitter at bedside per psych 1:1 recommendation. Problem: Discharge Planning  Goal: Discharge to home or other facility with appropriate resources  Outcome: Progressing  Note: Psych consulted- TBD. Problem: Skin/Tissue Integrity  Goal: Absence of new skin breakdown  Description: 1. Monitor for areas of redness and/or skin breakdown  2. Assess vascular access sites hourly  3. Every 4-6 hours minimum:  Change oxygen saturation probe site  4. Every 4-6 hours:  If on nasal continuous positive airway pressure, respiratory therapy assess nares and determine need for appliance change or resting period. Outcome: Progressing  Note: See skin integumentary flowsheet for skin assessment. Problem: ABCDS Injury Assessment  Goal: Absence of physical injury  Outcome: Progressing  Note: Pt is a High fall risk. See Chad Copping Fall Score and ABCDS Injury Risk assessments.   + Screening for Orthostasis and/or + High Fall Risk per SHAFER/ABCDS: Explained fall risk precautions to pt and family and rationale behind their use to keep the patient safe.  Pt bed is in low position, side rails up, call light and
self-injury during hospital stay  Description: INTERVENTIONS:  1. Ensure constant observer at bedside with Q15M safety checks  2. Maintain a safe environment  3. Secure patient belongings  4. Ensure family/visitors adhere to safety recommendations  5. Ensure safety tray has been added to patient's diet order  6.   Every shift and PRN: Re-assess suicidal risk via Frequent Screener    Outcome: Progressing  Flowsheets (Taken 5/5/2023 2000)  Will have no self-injury during hospital stay:   Ensure constant observer at bedside with Q15M safety checks   Maintain a safe environment   Secure patient belongings   Ensure family/visitors adhere to safety recommendations   Ensure safety tray has been added to patient's diet order   Every shift and PRN: Re-assess suicidal risk via Frequent Screener     Problem: Risk for Elopement  Goal: Patient will not exit the unit/facility without proper excort  Outcome: Progressing  Flowsheets (Taken 5/5/2023 2000)  Nursing Interventions for Elopement Risk:   Assist with personal care needs such as toileting, eating, dressing, as needed to reduce the risk of wandering   Collaborate with family members/caregivers to mitigate the elopement risk   Collaborate with treatment team for drug withdrawal symptoms treatment   Collaborate with treatment team for nicotine replacement   Communicate/escalate to charge nurse the risk of elopement   Communicate/escalate to /other team member the risk of elopement   Communicate/escalate to nursing supervisor the risk of elopement   Communicate to physician the risk for elopement   Escort with two staff members if patient must leave the unit   Make sure patient has all necessary personal care items     Problem: ABCDS Injury Assessment  Goal: Absence of physical injury  Outcome: Progressing  Flowsheets  Taken 5/5/2023 2213 by Luis Angel Lawrence RN  Absence of Physical Injury: Implement safety measures based on patient assessment  Taken 5/5/2023

## 2023-05-07 LAB
ANION GAP SERPL CALCULATED.3IONS-SCNC: 9 MMOL/L (ref 3–16)
BUN SERPL-MCNC: 7 MG/DL (ref 7–20)
CALCIUM SERPL-MCNC: 9.7 MG/DL (ref 8.3–10.6)
CHLORIDE SERPL-SCNC: 104 MMOL/L (ref 99–110)
CO2 SERPL-SCNC: 22 MMOL/L (ref 21–32)
CREAT SERPL-MCNC: 0.6 MG/DL (ref 0.6–1.1)
GFR SERPLBLD CREATININE-BSD FMLA CKD-EPI: >60 ML/MIN/{1.73_M2}
GLUCOSE BLD-MCNC: 130 MG/DL (ref 70–99)
GLUCOSE BLD-MCNC: 201 MG/DL (ref 70–99)
GLUCOSE BLD-MCNC: 227 MG/DL (ref 70–99)
GLUCOSE BLD-MCNC: 323 MG/DL (ref 70–99)
GLUCOSE SERPL-MCNC: 152 MG/DL (ref 70–99)
LITHIUM DOSE: ABNORMAL MG
LITHIUM SERPL-MCNC: 0.5 MMOL/L (ref 0.6–1.2)
MAGNESIUM SERPL-MCNC: 1.4 MG/DL (ref 1.8–2.4)
PERFORMED ON: ABNORMAL
POTASSIUM SERPL-SCNC: 3.7 MMOL/L (ref 3.5–5.1)
SODIUM SERPL-SCNC: 135 MMOL/L (ref 136–145)

## 2023-05-07 PROCEDURE — 83735 ASSAY OF MAGNESIUM: CPT

## 2023-05-07 PROCEDURE — 80178 ASSAY OF LITHIUM: CPT

## 2023-05-07 PROCEDURE — 1240000000 HC EMOTIONAL WELLNESS R&B

## 2023-05-07 PROCEDURE — 36415 COLL VENOUS BLD VENIPUNCTURE: CPT

## 2023-05-07 PROCEDURE — 6370000000 HC RX 637 (ALT 250 FOR IP): Performed by: NURSE PRACTITIONER

## 2023-05-07 PROCEDURE — 6370000000 HC RX 637 (ALT 250 FOR IP)

## 2023-05-07 PROCEDURE — 80048 BASIC METABOLIC PNL TOTAL CA: CPT

## 2023-05-07 RX ORDER — LANOLIN ALCOHOL/MO/W.PET/CERES
400 CREAM (GRAM) TOPICAL 2 TIMES DAILY
Status: COMPLETED | OUTPATIENT
Start: 2023-05-07 | End: 2023-05-07

## 2023-05-07 RX ADMIN — LEVOTHYROXINE SODIUM 25 MCG: 25 TABLET ORAL at 05:56

## 2023-05-07 RX ADMIN — INSULIN LISPRO 2 UNITS: 100 INJECTION, SOLUTION INTRAVENOUS; SUBCUTANEOUS at 12:37

## 2023-05-07 RX ADMIN — OXCARBAZEPINE 150 MG: 150 TABLET, FILM COATED ORAL at 09:23

## 2023-05-07 RX ADMIN — RISPERIDONE 1 MG: 1 TABLET, FILM COATED ORAL at 09:23

## 2023-05-07 RX ADMIN — Medication 400 MG: at 09:23

## 2023-05-07 RX ADMIN — RISPERIDONE 2 MG: 2 TABLET ORAL at 22:13

## 2023-05-07 RX ADMIN — QUETIAPINE FUMARATE 200 MG: 200 TABLET ORAL at 22:13

## 2023-05-07 RX ADMIN — Medication 400 MG: at 22:13

## 2023-05-07 RX ADMIN — INSULIN LISPRO 4 UNITS: 100 INJECTION, SOLUTION INTRAVENOUS; SUBCUTANEOUS at 22:12

## 2023-05-07 RX ADMIN — OXCARBAZEPINE 150 MG: 150 TABLET, FILM COATED ORAL at 22:13

## 2023-05-07 RX ADMIN — ESCITALOPRAM 10 MG: 10 TABLET, FILM COATED ORAL at 09:23

## 2023-05-08 LAB
ANION GAP SERPL CALCULATED.3IONS-SCNC: 9 MMOL/L (ref 3–16)
BUN SERPL-MCNC: 10 MG/DL (ref 7–20)
CALCIUM SERPL-MCNC: 9.7 MG/DL (ref 8.3–10.6)
CHLORIDE SERPL-SCNC: 100 MMOL/L (ref 99–110)
CO2 SERPL-SCNC: 25 MMOL/L (ref 21–32)
CREAT SERPL-MCNC: 0.7 MG/DL (ref 0.6–1.1)
GFR SERPLBLD CREATININE-BSD FMLA CKD-EPI: >60 ML/MIN/{1.73_M2}
GLUCOSE BLD-MCNC: 180 MG/DL (ref 70–99)
GLUCOSE BLD-MCNC: 255 MG/DL (ref 70–99)
GLUCOSE BLD-MCNC: 268 MG/DL (ref 70–99)
GLUCOSE BLD-MCNC: 299 MG/DL (ref 70–99)
GLUCOSE SERPL-MCNC: 303 MG/DL (ref 70–99)
MAGNESIUM SERPL-MCNC: 1.6 MG/DL (ref 1.8–2.4)
PERFORMED ON: ABNORMAL
POTASSIUM SERPL-SCNC: 3.7 MMOL/L (ref 3.5–5.1)
SODIUM SERPL-SCNC: 134 MMOL/L (ref 136–145)

## 2023-05-08 PROCEDURE — 83735 ASSAY OF MAGNESIUM: CPT

## 2023-05-08 PROCEDURE — 6370000000 HC RX 637 (ALT 250 FOR IP)

## 2023-05-08 PROCEDURE — 6370000000 HC RX 637 (ALT 250 FOR IP): Performed by: PSYCHIATRY & NEUROLOGY

## 2023-05-08 PROCEDURE — 80048 BASIC METABOLIC PNL TOTAL CA: CPT

## 2023-05-08 PROCEDURE — 1240000000 HC EMOTIONAL WELLNESS R&B

## 2023-05-08 PROCEDURE — 36415 COLL VENOUS BLD VENIPUNCTURE: CPT

## 2023-05-08 RX ORDER — LORAZEPAM 2 MG/1
2 TABLET ORAL
Status: COMPLETED | OUTPATIENT
Start: 2023-05-08 | End: 2023-05-08

## 2023-05-08 RX ADMIN — OXCARBAZEPINE 150 MG: 150 TABLET, FILM COATED ORAL at 11:05

## 2023-05-08 RX ADMIN — QUETIAPINE FUMARATE 200 MG: 200 TABLET ORAL at 21:59

## 2023-05-08 RX ADMIN — LORAZEPAM 2 MG: 2 TABLET ORAL at 23:33

## 2023-05-08 RX ADMIN — RISPERIDONE 2 MG: 2 TABLET ORAL at 21:58

## 2023-05-08 RX ADMIN — LEVOTHYROXINE SODIUM 25 MCG: 25 TABLET ORAL at 06:24

## 2023-05-08 RX ADMIN — TRAZODONE HYDROCHLORIDE 50 MG: 50 TABLET ORAL at 21:59

## 2023-05-08 RX ADMIN — INSULIN LISPRO 4 UNITS: 100 INJECTION, SOLUTION INTRAVENOUS; SUBCUTANEOUS at 18:49

## 2023-05-08 RX ADMIN — OXCARBAZEPINE 150 MG: 150 TABLET, FILM COATED ORAL at 21:58

## 2023-05-08 RX ADMIN — ESCITALOPRAM 10 MG: 10 TABLET, FILM COATED ORAL at 11:05

## 2023-05-08 RX ADMIN — RISPERIDONE 1 MG: 1 TABLET, FILM COATED ORAL at 11:05

## 2023-05-08 RX ADMIN — HYDROXYZINE HYDROCHLORIDE 50 MG: 50 TABLET, FILM COATED ORAL at 11:43

## 2023-05-08 NOTE — PLAN OF CARE
Pt has been cooperative and friendly this shift. Pt states she is sad, depressed, and has no support from family or friends. Pt was compliant with medications and denies all.       Problem: Chronic Conditions and Co-morbidities  Goal: Patient's chronic conditions and co-morbidity symptoms are monitored and maintained or improved  Outcome: Progressing  Pt's condition has been maintained     Problem: Self Harm/Suicidality  Goal: Will have no self-injury during hospital stay  Outcome: Progressing  Pt has not had any self injury this shift     Problem: Risk for Elopement  Goal: Patient will not exit the unit/facility without proper escort  Outcome: Progressing  Pt has not attempted to exit the unit/facility

## 2023-05-08 NOTE — BH NOTE
Pt has stayed in her room most of the afternoon with a constant observer. She was noted to be sitting on the bathroom floor hitting her head lightly on the wall. One of the techs she is familiar with went to sit with her until she was less upset. She eventually came out and asked to have the sitter discontinued. She states she is no longer suicidal. She is able to promise staff if she feels like hurting herself, she will seek staff assistance. Call placed to Dr. Ebenezer Gruber and new orders were noted. Pt will be moved up by the nurses station after the room is cleaned. Paul Taylor has been brighter and out in the common area watching TV.

## 2023-05-08 NOTE — DISCHARGE INSTRUCTIONS
team who will assist you in getting connected to additional mental health or substance abuse services. You can also contact the [address] team at [phone] for additional resources. [address] [facility] was in contact with your , YUKO Barajas. She will be in contact with you. Name of Provider: YUKO Barajas  Provider specialty/license: Cierra Sanchez Officer  Date and time of appointment: Vineetjavier Smith WILL BE IN CONTACT. The type/s of services requested are: Ronny Nolan name: [address] [facility]  Address: [address] [facility], [address] [facility], [address] [place] [address] [address]   Phone Number: [phone]  Special instructions (what to bring to appointment, etc.):      Discharge Completed By: HERNANDEZ Olmedo  Fax to: Follow up provider name and number  Jen Penn CM Silvana- [phone]  YUKO Barajas- [phone]    The following personal items were collected during your admission and were returned to you:    Belongings  Dental Appliances: None  Vision - Corrective Lenses: None  Hearing Aid: None  Clothing: Other (Comment) (no clothing brought with patient)  Jewelry: None  Body Piercings Removed: N/A  Electronic Devices: None  Weapons (Notify Protective Services/Security): None  Other Valuables: Other (Comment) (nothing brought in to the hospital wth her/)  Home Medications: None  Valuables Given To: Patient  Provide Name(s) of Who Valuable(s) Were Given To: n/a  Responsible person(s) in the waiting room: na    By signing below, I understand and acknowledge receipt of the instructions indicated above.

## 2023-05-08 NOTE — BH NOTE
Pt in her room crying, PCA went in and pt stated she wanted to kill herself. Writer talked with pt and she states that she had messed everything up and she just wanted to die. She states \"If I had something sharp, I would cut my throat. \" MD notified and suicide precautions started.

## 2023-05-09 LAB
GLUCOSE BLD-MCNC: 196 MG/DL (ref 70–99)
GLUCOSE BLD-MCNC: 209 MG/DL (ref 70–99)
GLUCOSE BLD-MCNC: 213 MG/DL (ref 70–99)
GLUCOSE BLD-MCNC: 300 MG/DL (ref 70–99)
PERFORMED ON: ABNORMAL

## 2023-05-09 PROCEDURE — 1240000000 HC EMOTIONAL WELLNESS R&B

## 2023-05-09 PROCEDURE — 6360000002 HC RX W HCPCS

## 2023-05-09 PROCEDURE — 6370000000 HC RX 637 (ALT 250 FOR IP): Performed by: PSYCHIATRY & NEUROLOGY

## 2023-05-09 PROCEDURE — 6370000000 HC RX 637 (ALT 250 FOR IP)

## 2023-05-09 PROCEDURE — 6370000000 HC RX 637 (ALT 250 FOR IP): Performed by: NURSE PRACTITIONER

## 2023-05-09 RX ORDER — PANTOPRAZOLE SODIUM 20 MG/1
20 TABLET, DELAYED RELEASE ORAL
Status: DISCONTINUED | OUTPATIENT
Start: 2023-05-09 | End: 2023-05-11 | Stop reason: HOSPADM

## 2023-05-09 RX ORDER — LANOLIN ALCOHOL/MO/W.PET/CERES
400 CREAM (GRAM) TOPICAL DAILY
Status: COMPLETED | OUTPATIENT
Start: 2023-05-09 | End: 2023-05-11

## 2023-05-09 RX ADMIN — PANTOPRAZOLE SODIUM 20 MG: 20 TABLET, DELAYED RELEASE ORAL at 16:21

## 2023-05-09 RX ADMIN — HALOPERIDOL 5 MG: 5 TABLET ORAL at 01:52

## 2023-05-09 RX ADMIN — INSULIN LISPRO 4 UNITS: 100 INJECTION, SOLUTION INTRAVENOUS; SUBCUTANEOUS at 21:59

## 2023-05-09 RX ADMIN — RISPERIDONE 1 MG: 1 TABLET, FILM COATED ORAL at 08:44

## 2023-05-09 RX ADMIN — RISPERIDONE 2 MG: 2 TABLET ORAL at 21:57

## 2023-05-09 RX ADMIN — QUETIAPINE FUMARATE 200 MG: 200 TABLET ORAL at 21:57

## 2023-05-09 RX ADMIN — INSULIN LISPRO 2 UNITS: 100 INJECTION, SOLUTION INTRAVENOUS; SUBCUTANEOUS at 08:42

## 2023-05-09 RX ADMIN — INSULIN LISPRO 2 UNITS: 100 INJECTION, SOLUTION INTRAVENOUS; SUBCUTANEOUS at 12:26

## 2023-05-09 RX ADMIN — DIPHENHYDRAMINE HYDROCHLORIDE 50 MG: 50 INJECTION, SOLUTION INTRAMUSCULAR; INTRAVENOUS at 01:05

## 2023-05-09 RX ADMIN — OXCARBAZEPINE 150 MG: 150 TABLET, FILM COATED ORAL at 21:57

## 2023-05-09 RX ADMIN — LEVOTHYROXINE SODIUM 25 MCG: 25 TABLET ORAL at 06:36

## 2023-05-09 RX ADMIN — Medication 400 MG: at 16:21

## 2023-05-09 RX ADMIN — OXCARBAZEPINE 150 MG: 150 TABLET, FILM COATED ORAL at 08:44

## 2023-05-09 RX ADMIN — ESCITALOPRAM 10 MG: 10 TABLET, FILM COATED ORAL at 08:44

## 2023-05-09 NOTE — PLAN OF CARE
Tania Boston continues to state that she feels suicidal. She is tearful at times and states \"I just don't want thrown out of here. \" She is able to promise that she will not attempt to hurt herself or anyone else while here. She agrees to seek out staff if she has any suicidal thoughts. She states she is very sleepy and just needs to sleep this morning. She continues to state that she feels very depressed and hopeless since she can't go back to her shelter house. She denies any hallucinations today. She is not noted to be responding to any internal stimuli. She denies any needs except sleep. Meds taken as ordered. Problem: Chronic Conditions and Co-morbidities  Goal: Patient's chronic conditions and co-morbidity symptoms are monitored and maintained or improved  5/9/2023 1051 by Hazel Sandoval RN  Outcome: Progressing  Flowsheets (Taken 5/9/2023 1017)  Care Plan - Patient's Chronic Conditions and Co-Morbidity Symptoms are Monitored and Maintained or Improved:   Monitor and assess patient's chronic conditions and comorbid symptoms for stability, deterioration, or improvement   Collaborate with multidisciplinary team to address chronic and comorbid conditions and prevent exacerbation or deterioration  5/9/2023 0213 by Derrick Guevara RN  Outcome: Not Progressing  Flowsheets (Taken 5/8/2023 1838 by Hazel Sandoval RN)  Care Plan - Patient's Chronic Conditions and Co-Morbidity Symptoms are Monitored and Maintained or Improved:   Monitor and assess patient's chronic conditions and comorbid symptoms for stability, deterioration, or improvement   Collaborate with multidisciplinary team to address chronic and comorbid conditions and prevent exacerbation or deterioration   Update acute care plan with appropriate goals if chronic or comorbid symptoms are exacerbated and prevent overall improvement and discharge     Problem: Self Harm/Suicidality  Goal: Will have no self-injury during hospital stay  Description: INTERVENTIONS:  1.

## 2023-05-09 NOTE — PLAN OF CARE
Problem: Chronic Conditions and Co-morbidities  Goal: Patient's chronic conditions and co-morbidity symptoms are monitored and maintained or improved  Outcome: Not Progressing  Flowsheets (Taken 2023 by Johanny Bourgeois, RN)  Care Plan - Patient's Chronic Conditions and Co-Morbidity Symptoms are Monitored and Maintained or Improved:   Monitor and assess patient's chronic conditions and comorbid symptoms for stability, deterioration, or improvement   Collaborate with multidisciplinary team to address chronic and comorbid conditions and prevent exacerbation or deterioration   Update acute care plan with appropriate goals if chronic or comorbid symptoms are exacerbated and prevent overall improvement and discharge     Problem: Self Harm/Suicidality  Goal: Will have no self-injury during hospital stay  Description: INTERVENTIONS:  1. Ensure constant observer at bedside with Q15M safety checks  2. Maintain a safe environment  3. Secure patient belongings  4. Ensure family/visitors adhere to safety recommendations  5. Ensure safety tray has been added to patient's diet order  6. Every shift and PRN: Re-assess suicidal risk via Frequent Screener    Outcome: Not Progressing     Problem: Risk for Elopement  Goal: Patient will not exit the unit/facility without proper excort  Outcome: Progressing  Flowsheets (Taken 2023 by Johanny Bourgeois, RN)  Nursing Interventions for Elopement Risk: Reduce environmental triggers   Pt has continued to talk about suicidal feelings and wishing she was dead. States nothing to live for especially since her mother  [birth year]-3[birth year]21. Pt did not dress out in gown when asked to do so, but otherwise has been cooperative. Talked by phone to Dr. Villa Chatman about her SI with some intent. Pt is right in front of the TS and can be seen easily from the TS. Pt's door is remaining open. Asked pt to let staff know before going to the bathroom. Pt had many meds tonight.  At first she couldn't

## 2023-05-10 LAB
ANION GAP SERPL CALCULATED.3IONS-SCNC: 9 MMOL/L (ref 3–16)
BUN SERPL-MCNC: 11 MG/DL (ref 7–20)
CALCIUM SERPL-MCNC: 9.8 MG/DL (ref 8.3–10.6)
CHLORIDE SERPL-SCNC: 102 MMOL/L (ref 99–110)
CO2 SERPL-SCNC: 24 MMOL/L (ref 21–32)
CREAT SERPL-MCNC: 0.6 MG/DL (ref 0.6–1.1)
GFR SERPLBLD CREATININE-BSD FMLA CKD-EPI: >60 ML/MIN/{1.73_M2}
GLUCOSE BLD-MCNC: 201 MG/DL (ref 70–99)
GLUCOSE BLD-MCNC: 214 MG/DL (ref 70–99)
GLUCOSE BLD-MCNC: 316 MG/DL (ref 70–99)
GLUCOSE SERPL-MCNC: 226 MG/DL (ref 70–99)
PERFORMED ON: ABNORMAL
POTASSIUM SERPL-SCNC: 4 MMOL/L (ref 3.5–5.1)
SODIUM SERPL-SCNC: 135 MMOL/L (ref 136–145)

## 2023-05-10 PROCEDURE — 6360000002 HC RX W HCPCS

## 2023-05-10 PROCEDURE — 6370000000 HC RX 637 (ALT 250 FOR IP): Performed by: NURSE PRACTITIONER

## 2023-05-10 PROCEDURE — 36415 COLL VENOUS BLD VENIPUNCTURE: CPT

## 2023-05-10 PROCEDURE — 6370000000 HC RX 637 (ALT 250 FOR IP)

## 2023-05-10 PROCEDURE — 1240000000 HC EMOTIONAL WELLNESS R&B

## 2023-05-10 PROCEDURE — 6370000000 HC RX 637 (ALT 250 FOR IP): Performed by: PSYCHIATRY & NEUROLOGY

## 2023-05-10 PROCEDURE — 80048 BASIC METABOLIC PNL TOTAL CA: CPT

## 2023-05-10 RX ADMIN — HALOPERIDOL 5 MG: 5 TABLET ORAL at 22:45

## 2023-05-10 RX ADMIN — LEVOTHYROXINE SODIUM 25 MCG: 25 TABLET ORAL at 06:45

## 2023-05-10 RX ADMIN — OXCARBAZEPINE 150 MG: 150 TABLET, FILM COATED ORAL at 09:12

## 2023-05-10 RX ADMIN — ACETAMINOPHEN 650 MG: 325 TABLET ORAL at 20:41

## 2023-05-10 RX ADMIN — PANTOPRAZOLE SODIUM 20 MG: 20 TABLET, DELAYED RELEASE ORAL at 06:45

## 2023-05-10 RX ADMIN — ESCITALOPRAM 10 MG: 10 TABLET, FILM COATED ORAL at 09:14

## 2023-05-10 RX ADMIN — DIPHENHYDRAMINE HYDROCHLORIDE 50 MG: 50 INJECTION, SOLUTION INTRAMUSCULAR; INTRAVENOUS at 23:15

## 2023-05-10 RX ADMIN — RISPERIDONE 2 MG: 2 TABLET ORAL at 20:41

## 2023-05-10 RX ADMIN — INSULIN LISPRO 2 UNITS: 100 INJECTION, SOLUTION INTRAVENOUS; SUBCUTANEOUS at 12:10

## 2023-05-10 RX ADMIN — INSULIN LISPRO 6 UNITS: 100 INJECTION, SOLUTION INTRAVENOUS; SUBCUTANEOUS at 17:34

## 2023-05-10 RX ADMIN — OXCARBAZEPINE 150 MG: 150 TABLET, FILM COATED ORAL at 20:41

## 2023-05-10 RX ADMIN — INSULIN LISPRO 2 UNITS: 100 INJECTION, SOLUTION INTRAVENOUS; SUBCUTANEOUS at 09:11

## 2023-05-10 RX ADMIN — Medication 400 MG: at 09:12

## 2023-05-10 RX ADMIN — RISPERIDONE 1 MG: 1 TABLET, FILM COATED ORAL at 09:12

## 2023-05-10 RX ADMIN — QUETIAPINE FUMARATE 200 MG: 200 TABLET ORAL at 20:41

## 2023-05-10 ASSESSMENT — PAIN DESCRIPTION - LOCATION: LOCATION: GENERALIZED

## 2023-05-10 ASSESSMENT — PAIN SCALES - GENERAL: PAINLEVEL_OUTOF10: 6

## 2023-05-10 NOTE — PLAN OF CARE
Problem: Self Harm/Suicidality  Goal: Will have no self-injury during hospital stay  Description: INTERVENTIONS:  1. Ensure constant observer at bedside with Q15M safety checks  2. Maintain a safe environment  3. Secure patient belongings  4. Ensure family/visitors adhere to safety recommendations  5. Ensure safety tray has been added to patient's diet order  6. Every shift and PRN: Re-assess suicidal risk via Frequent Screener    Outcome: Progressing     Problem: Risk for Elopement  Goal: Patient will not exit the unit/facility without proper excort  Outcome: Progressing     Problem: Chronic Conditions and Co-morbidities  Goal: Patient's chronic conditions and co-morbidity symptoms are monitored and maintained or improved  Outcome: Not Progressing   Pt was worried about having to leave and seems to have no reason to attempt elopement. HS FSBS was 300 mg/dl. Pt given Humalog 4 units subcut into back of left upper arm. Seemed to tolerate well. Pt talking like she might not be here long, (eluding to suicide.) 1 to 1 with pt. She was briefly very tearful. Discussing the positives in her life. Discussed the good things that she could have in her life. Etc. Pt did not speak of any SI again this shift so far, 3112. Pt sleeping well.

## 2023-05-10 NOTE — PLAN OF CARE
Problem: Chronic Conditions and Co-morbidities  Goal: Patient's chronic conditions and co-morbidity symptoms are monitored and maintained or improved  5/10/2023 1505 by Bryce Chan LPN  Outcome: Progressing  5/10/2023 0408 by Gissel Lovell RN  Outcome: Not Progressing   Pt isolative to room, crying, states she is worried about discharge to a homeless shelter because she can't go back to [facility]. Asked pt to call the [facility] attempt to get readmittance which she refused to do. Pt states she will talk to staff if she feels like she is going to harm herself.

## 2023-05-10 NOTE — BH NOTE
Writer contacted patients , Nereida Bell with [facility]. Rachel Rosas reported that patient is not mandated to treatment just mandated to take her medications. Rachel Rosas did state that she had faxed over paper work to [facility] and was waiting to hear back. Rachel Rosas stated that patient may have to go to a shelter until they hear back from [facility] or find another place for patient. [employer] cell phone number is [phone] and [employer] direct number is [phone].

## 2023-05-11 VITALS
SYSTOLIC BLOOD PRESSURE: 141 MMHG | OXYGEN SATURATION: 97 % | RESPIRATION RATE: 18 BRPM | HEART RATE: 55 BPM | DIASTOLIC BLOOD PRESSURE: 74 MMHG | BODY MASS INDEX: 42.53 KG/M2 | TEMPERATURE: 98.2 F | WEIGHT: 249.12 LBS | HEIGHT: 64 IN

## 2023-05-11 PROBLEM — N17.9 AKI (ACUTE KIDNEY INJURY) (HCC): Status: RESOLVED | Noted: 2017-11-15 | Resolved: 2023-05-11

## 2023-05-11 PROBLEM — R73.9 ELEVATED BLOOD SUGAR: Status: ACTIVE | Noted: 2023-05-11

## 2023-05-11 LAB
GLUCOSE BLD-MCNC: 215 MG/DL (ref 70–99)
PERFORMED ON: ABNORMAL

## 2023-05-11 PROCEDURE — 6370000000 HC RX 637 (ALT 250 FOR IP): Performed by: NURSE PRACTITIONER

## 2023-05-11 PROCEDURE — 6370000000 HC RX 637 (ALT 250 FOR IP): Performed by: PSYCHIATRY & NEUROLOGY

## 2023-05-11 PROCEDURE — 6370000000 HC RX 637 (ALT 250 FOR IP)

## 2023-05-11 RX ORDER — LEVOTHYROXINE SODIUM 0.03 MG/1
25 TABLET ORAL
Qty: 7 TABLET | Refills: 0 | Status: SHIPPED | OUTPATIENT
Start: 2023-05-11 | End: 2023-05-18

## 2023-05-11 RX ORDER — RISPERIDONE 2 MG/1
2 TABLET ORAL 2 TIMES DAILY
Qty: 14 TABLET | Refills: 0 | Status: SHIPPED | OUTPATIENT
Start: 2023-05-11 | End: 2023-05-18

## 2023-05-11 RX ORDER — ESCITALOPRAM OXALATE 10 MG/1
10 TABLET ORAL DAILY
Qty: 7 TABLET | Refills: 0 | Status: SHIPPED | OUTPATIENT
Start: 2023-05-11 | End: 2023-05-18

## 2023-05-11 RX ORDER — PANTOPRAZOLE SODIUM 20 MG/1
20 TABLET, DELAYED RELEASE ORAL
Qty: 14 TABLET | Refills: 0 | Status: SHIPPED | OUTPATIENT
Start: 2023-05-11 | End: 2023-05-18

## 2023-05-11 RX ORDER — OXCARBAZEPINE 150 MG/1
150 TABLET, FILM COATED ORAL 2 TIMES DAILY
Qty: 14 TABLET | Refills: 0 | Status: SHIPPED | OUTPATIENT
Start: 2023-05-11 | End: 2023-05-18

## 2023-05-11 RX ADMIN — Medication 400 MG: at 10:05

## 2023-05-11 RX ADMIN — PANTOPRAZOLE SODIUM 20 MG: 20 TABLET, DELAYED RELEASE ORAL at 07:06

## 2023-05-11 RX ADMIN — RISPERIDONE 1 MG: 1 TABLET, FILM COATED ORAL at 10:05

## 2023-05-11 RX ADMIN — LEVOTHYROXINE SODIUM 25 MCG: 25 TABLET ORAL at 07:06

## 2023-05-11 RX ADMIN — ESCITALOPRAM 10 MG: 10 TABLET, FILM COATED ORAL at 10:05

## 2023-05-11 RX ADMIN — OXCARBAZEPINE 150 MG: 150 TABLET, FILM COATED ORAL at 10:05

## 2023-05-11 NOTE — PROGRESS NOTES
1:1 Assessment  minutes. Patient is alert and oriented x 4. Uses direct, intense at times eye contact and is dressed appropriately in street clothing. Patient denied HI,A/V hallucinations, endorsed passive SI, at times struggling tonight due to uncertainty of discharge destination. The patient stated her mood was:\"depressed\". Patient didn't attend groups this shift. The patient does understand why they are here. Patient is concerned that she is to be discharged from the hospital tomorrow. She is apprehensive  about this discharge plan. She has been in contact with her PO about rehab and she is hoping to get into rehab. Patient is medication compliant, refused hs fingerstick tonight. Judgement,insight and impulse control are limited. Vital signs are noted. Safety checks continue Q 15 minutes throughout the shift. PRNS this shift? Tylenol, haldol with fair results. .  Patient obtained 4.5 hours of sleep this shift.
Department of Psychiatry  Progress Note    Patient's chart was reviewed. Discussed with treatment team. Met with patient. SUBJECTIVE:      Continues to report chronic SI. Says it's worse when thinking about her PO or where she'll go after discharge. Does not present as a depressed or psychotic person. Isolative to her room mostly. Discussed DC tomorrow but Says she hopes to stay here for a while as she has nowhere to go.      ROS:   Patient has new complaints: no  Sleeping adequately:  Yes   Appetite adequate: Yes  Engaged in programming: No:     OBJECTIVE:  VITALS:  BP (!) 141/74   Pulse 55   Temp 98.2 °F (36.8 °C) (Oral)   Resp 18   Ht 5' 4\" (1.626 m)   Wt 249 lb 1.9 oz (113 kg)   LMP  (LMP Unknown)   SpO2 97%   BMI 42.76 kg/m²     Mental Status Examination:    Appearance: limited grooming and hygiene  Behavior/Attitude toward examiner:  fair eye contact  Speech: Normal rate, volume, amount  Mood:  \"ok\"  Affect:  blunted     Thought processes:  Goal directed, linear, no IWONA or gross disorganization  Thought Content: + SI (chronic and conditional) no HI, no delusions voiced, no obsessions  Perceptions: no AVH  Attention: attention span and concentration were intact to interview   Abstraction: intact  Cognition:  Alert and oriented to person, place, time, and situation, recall intact  Insight: fair  Judgment: fair    Medication:  Scheduled:   pantoprazole  20 mg Oral BID AC    magnesium oxide  400 mg Oral Daily    escitalopram  10 mg Oral Daily    risperiDONE  1 mg Oral Daily    risperiDONE  2 mg Oral Nightly    insulin lispro  0-8 Units SubCUTAneous TID WC    insulin lispro  0-4 Units SubCUTAneous Nightly    levothyroxine  25 mcg Oral Daily    OXcarbazepine  150 mg Oral BID    QUEtiapine  200 mg Oral Nightly        PRN:  acetaminophen, magnesium hydroxide, nicotine polacrilex, aluminum & magnesium hydroxide-simethicone, hydrOXYzine HCl, diphenhydrAMINE, haloperidol **OR** haloperidol lactate
Department of Psychiatry  Progress Note    Patient's chart was reviewed. Discussed with treatment team. Met with patient. SUBJECTIVE:      Further chart review reveals her SI is chronic in nature -  goes back years and tends to wax and wane depending on stressors. Despite expressing SI here, she has demonstrated safe behavior. Isolative. Encouraged to participate in programming.      ROS:   Patient has new complaints: no  Sleeping adequately:  Yes   Appetite adequate: Yes  Engaged in programming: No:     OBJECTIVE:  VITALS:  BP (!) 168/145   Pulse 80   Temp 97.3 °F (36.3 °C) (Oral) Comment: These vs were actually taken at 2000 on 5-9-23  Resp 16   Ht 5' 4\" (1.626 m)   Wt 249 lb 1.9 oz (113 kg)   LMP  (LMP Unknown)   SpO2 97%   BMI 42.76 kg/m²     Mental Status Examination:    Appearance: limited grooming and hygiene  Behavior/Attitude toward examiner:  fair eye contact  Speech: Normal rate, volume, amount  Mood:  \"ok\"  Affect:  blunted     Thought processes:  Goal directed, linear, no IWONA or gross disorganization  Thought Content: + SI (chronic) no HI, no delusions voiced, no obsessions  Perceptions: no AVH  Attention: attention span and concentration were intact to interview   Abstraction: intact  Cognition:  Alert and oriented to person, place, time, and situation, recall intact  Insight: fair  Judgment: fair    Medication:  Scheduled:   pantoprazole  20 mg Oral BID AC    magnesium oxide  400 mg Oral Daily    escitalopram  10 mg Oral Daily    risperiDONE  1 mg Oral Daily    risperiDONE  2 mg Oral Nightly    insulin lispro  0-8 Units SubCUTAneous TID WC    insulin lispro  0-4 Units SubCUTAneous Nightly    levothyroxine  25 mcg Oral Daily    OXcarbazepine  150 mg Oral BID    QUEtiapine  200 mg Oral Nightly        PRN:  acetaminophen, magnesium hydroxide, nicotine polacrilex, aluminum & magnesium hydroxide-simethicone, hydrOXYzine HCl, diphenhydrAMINE, haloperidol **OR** haloperidol lactate
Patient continues to be restless. With frequent movement of legs and body. She is medicated with benadryl 50 mg IM to left deltoid muscle mass. Tolerated procedure fair.
Patient encouraged to attend group, patient refusing to engage in unit programming. Patient stated, \"I just want to stay in bed. \"  Patient has maintained safety, has remained calm with no self harm noted. Patient did awaken and ate breakfast and took her scheduled medications. She was calm and cooperative during 1:1. She continues with some suicidal thoughts, but states this is a chronic condition for her and provider is aware. Discussed wanting to go to rehab if  can find one for her to go to, at this time no bed is available. Per discharge planner who spoke with patient's , he would like for her to go to a shelter and will continue to find treatment places and will pick her up from the shelter to take her. Patient was aware of discharge.
Patient is noted to be resting quietly in bed with both eyes closed at this time. Medication not noted to be effective in a timely manner.
Patient is placed on a 1:1 at this time. Patient has had constant sitter throughout the early part of shift. Patient remained chronically suicidal, she doesn't want to be discharged to what she feels is an [place]' situation, and patient continues to fidget with items that have staff questioning her safety. (Sheet,socks,blanket). Patient is made aware that she will be placed on a 1:1 until she is seen by doctor in the morning.
Patient is restless. She was offered and accepted haldol 5 mg po for restless agitation.
Patient is ruminating about where she will go when discharged. She is frightened to go to a shelter. She prefers rehab. She has communicated that to her  and is hoping that she will be able to get her into a rehab soon. Patient verbalizes that she might hurt herself staff was able to sit with patient. Encouraged patient to do some quiet activities to distract her mind, but she refused. Room environment checked and all loose items that aren't essential for patient use have been removed from room.
Patient refusing breakfast, . Patient refusing her AM insulin 2 units of sliding scale coverage.
Patient remains restless, talking about her fear of being discharged to a shelter, get restless and more animated as she talks.
Patient unwilling to complete safety plan.
Spoke with  about patient's restlessness and medications that have been provided thus far. Patient is not relaxing or resting as a result of the medications. She remains restless and sighing frequently. Refuses to participate in any activities suggested to distract herself. She has implied that she will hurt herself if she is discharged to a shelter. She remains anxious about her situation, no coping skills to fall back on, she resorts to her old coping skills which aren't healthy ones. New order for 1:1 obtained for patient safety.
lactate     ASSESSMENT AND PLAN:  53 y.o. female who presents  to [facility] ED 5/3/23 from a psychiatric facility due to reported altered mental status. Principal Problem:    Borderline personality disorder ([facility] [address]  Active Problems:    Mood disorder ([facility] [address]    NERY (acute kidney injury) ([facility] [address]  Resolved Problems:    * No resolved hospital problems. *     1. Admitted to psychiatry for stabilization. 2. On admission, ordered q15min checks for safety, programming, and prn medication for anxiety, agitation, and insomnia. - Lexapro 10 mg daily - Continued  - Risperdal 1 mg daily - Continued  - Lithium 600 mg BID was changed to Trileptal 300 mg BID at [place]. Dose lowered to - 150mg BID  - Suboxone 4-1 mg BID - Stopped  - Risperdal 2 mg HS - Continued  - Seroquel 400 mg HS - lowered to 200mg HS     5/8/2023 - 1:1 as needed for safety. 3. Hospitalist consult for admission. Nephrology following:  - NERY Cr of 0.6 now after dialysis as normal a couple of months ago may be due to volume depletion and use of lisinopril. Continue to hold lisinopril. - Underwent urgent dialysis due to lithium toxicity with NERY and oliguria now lithium level 0.5 no further dialysis. 4. Voluntary. Total time with patient was 50 minutes and more than 50 % of that time was spent counseling the patient on their symptoms, treatment, and expected goals.      Flory Beebe MD

## 2023-05-11 NOTE — PLAN OF CARE
[facility]  Discharge Note    Pt discharged with followings belongings:   Dental Appliances: None  Vision - Corrective Lenses: None  Hearing Aid: None  Jewelry: None  Body Piercings Removed: N/A  Clothing: Other (Comment) (no clothing brought with patient)  Other Valuables: Other (Comment) (nothing brought in to the hospital wth her/)   Valuables sent home with belongings brought here from [facility]. Patient educated on aftercare instructions: yes, Patient did refuse to sign aftercare instructions and stated that she would not be filling her medications. Patient was re-educated on the need for medication and she verbalized understanding. Information faxed to [facility], [facility] [phone] and [facility] [phone]  at 12:17 PM .Patient verbalize understanding of AVS:  yes. Status EXAM upon discharge:  Mental Status and Behavioral Exam  Normal: No  Level of Assistance: Independent/Self  Facial Expression: Sad, Worried  Affect: Unstable  Level of Consciousness: Alert  Frequency of Checks: 4 times per hour, close  Mood:Normal: No  Mood: Depressed, Anxious, Worthless, low self-esteem, Sad  Motor Activity:Normal: No  Motor Activity: Decreased  Eye Contact: Fair  Observed Behavior: Withdrawn, Cooperative, Friendly  Sexual Misconduct History: Current - no  Preception: [place] to person, [place] to time, [place] to place, [place] to situation  Attention:Normal: No  Attention: Distractible, Unable to concentrate  Thought Processes: Perseveration  Thought Content:Normal: No  Thought Content: Preoccupations  Depression Symptoms: Change in energy level, Feelings of hopelessess, Loss of interest, Psychomotor retardation  Anxiety Symptoms: Generalized  Shakira Symptoms: No problems reported or observed. Hallucinations: Auditory (comment) (pt states more instrusive degrading thoughts)  Delusions: Yes  Delusions:  Other (comment)  Memory:Normal: No  Memory: Poor recent  Insight and Judgment:

## 2023-05-11 NOTE — PLAN OF CARE
Problem: Chronic Conditions and Co-morbidities  Goal: Patient's chronic conditions and co-morbidity symptoms are monitored and maintained or improved  5/10/2023 2227 by Janes Zazueta RN  Outcome: Progressing     Problem: Self Harm/Suicidality  Goal: Will have no self-injury during hospital stay  Description: INTERVENTIONS:  1. Ensure constant observer at bedside with Q15M safety checks  2. Maintain a safe environment  3. Secure patient belongings  4. Ensure family/visitors adhere to safety recommendations  5. Ensure safety tray has been added to patient's diet order  6.   Every shift and PRN: Re-assess suicidal risk via Frequent Screener    5/10/2023 2227 by Janes Zazueta RN  Outcome: Progressing     Problem: Risk for Elopement  Goal: Patient will not exit the unit/facility without proper excort  5/10/2023 2227 by Janes Zazueta RN  Outcome: Progressing     Problem: Pain  Goal: Verbalizes/displays adequate comfort level or baseline comfort level  Outcome: Progressing

## 2023-05-12 ENCOUNTER — FOLLOWUP TELEPHONE ENCOUNTER (OUTPATIENT)
Dept: PSYCHIATRY | Age: 48
End: 2023-05-12

## 2023-05-15 ENCOUNTER — FOLLOWUP TELEPHONE ENCOUNTER (OUTPATIENT)
Dept: PSYCHIATRY | Age: 48
End: 2023-05-15

## 2023-05-16 ENCOUNTER — FOLLOWUP TELEPHONE ENCOUNTER (OUTPATIENT)
Dept: PSYCHIATRY | Age: 48
End: 2023-05-16

## 2023-06-05 ENCOUNTER — APPOINTMENT (OUTPATIENT)
Dept: GENERAL RADIOLOGY | Age: 48
End: 2023-06-05
Payer: MEDICARE

## 2023-06-05 ENCOUNTER — HOSPITAL ENCOUNTER (EMERGENCY)
Age: 48
Discharge: HOME | End: 2023-06-06
Attending: STUDENT IN AN ORGANIZED HEALTH CARE EDUCATION/TRAINING PROGRAM
Payer: MEDICARE

## 2023-06-05 VITALS
TEMPERATURE: 97.9 F | BODY MASS INDEX: 42.19 KG/M2 | RESPIRATION RATE: 22 BRPM | SYSTOLIC BLOOD PRESSURE: 127 MMHG | WEIGHT: 247.14 LBS | DIASTOLIC BLOOD PRESSURE: 66 MMHG | HEIGHT: 64 IN | OXYGEN SATURATION: 97 % | HEART RATE: 77 BPM

## 2023-06-05 DIAGNOSIS — R51.9 ACUTE NONINTRACTABLE HEADACHE, UNSPECIFIED HEADACHE TYPE: ICD-10-CM

## 2023-06-05 DIAGNOSIS — R07.9 ACUTE CHEST PAIN: Primary | ICD-10-CM

## 2023-06-05 PROCEDURE — 93005 ELECTROCARDIOGRAM TRACING: CPT | Performed by: STUDENT IN AN ORGANIZED HEALTH CARE EDUCATION/TRAINING PROGRAM

## 2023-06-05 PROCEDURE — 71045 X-RAY EXAM CHEST 1 VIEW: CPT

## 2023-06-05 ASSESSMENT — PAIN SCALES - GENERAL: PAINLEVEL_OUTOF10: 6

## 2023-06-05 ASSESSMENT — PAIN - FUNCTIONAL ASSESSMENT: PAIN_FUNCTIONAL_ASSESSMENT: 0-10

## 2023-06-05 ASSESSMENT — PAIN DESCRIPTION - LOCATION: LOCATION: CHEST

## 2023-06-06 ENCOUNTER — APPOINTMENT (OUTPATIENT)
Dept: CT IMAGING | Age: 48
End: 2023-06-06
Payer: MEDICARE

## 2023-06-06 LAB
ALBUMIN SERPL-MCNC: 3.8 G/DL (ref 3.4–5)
ALBUMIN/GLOB SERPL: 1.3 {RATIO} (ref 1.1–2.2)
ALP SERPL-CCNC: 74 U/L (ref 40–129)
ALT SERPL-CCNC: 24 U/L (ref 10–40)
AMPHETAMINES UR QL SCN>1000 NG/ML: ABNORMAL
ANION GAP SERPL CALCULATED.3IONS-SCNC: 11 MMOL/L (ref 3–16)
AST SERPL-CCNC: 17 U/L (ref 15–37)
BARBITURATES UR QL SCN>200 NG/ML: ABNORMAL
BASOPHILS # BLD: 0.1 K/UL (ref 0–0.2)
BASOPHILS NFR BLD: 0.7 %
BENZODIAZ UR QL SCN>200 NG/ML: ABNORMAL
BILIRUB SERPL-MCNC: <0.2 MG/DL (ref 0–1)
BUN SERPL-MCNC: 17 MG/DL (ref 7–20)
CALCIUM SERPL-MCNC: 9.8 MG/DL (ref 8.3–10.6)
CANNABINOIDS UR QL SCN>50 NG/ML: ABNORMAL
CHLORIDE SERPL-SCNC: 102 MMOL/L (ref 99–110)
CO2 SERPL-SCNC: 24 MMOL/L (ref 21–32)
COCAINE UR QL SCN: ABNORMAL
CREAT SERPL-MCNC: <0.5 MG/DL (ref 0.6–1.1)
DEPRECATED RDW RBC AUTO: 13.6 % (ref 12.4–15.4)
DRUG SCREEN COMMENT UR-IMP: ABNORMAL
EKG ATRIAL RATE: 80 BPM
EKG DIAGNOSIS: NORMAL
EKG P AXIS: 8 DEGREES
EKG P-R INTERVAL: 158 MS
EKG Q-T INTERVAL: 388 MS
EKG QRS DURATION: 80 MS
EKG QTC CALCULATION (BAZETT): 447 MS
EKG R AXIS: 59 DEGREES
EKG T AXIS: 6 DEGREES
EKG VENTRICULAR RATE: 80 BPM
EOSINOPHIL # BLD: 0.2 K/UL (ref 0–0.6)
EOSINOPHIL NFR BLD: 2.1 %
ETHANOLAMINE SERPL-MCNC: NORMAL MG/DL (ref 0–0.08)
FENTANYL SCREEN, URINE: ABNORMAL
GFR SERPLBLD CREATININE-BSD FMLA CKD-EPI: >60 ML/MIN/{1.73_M2}
GLUCOSE SERPL-MCNC: 280 MG/DL (ref 70–99)
HCT VFR BLD AUTO: 42.1 % (ref 36–48)
HGB BLD-MCNC: 14.4 G/DL (ref 12–16)
LYMPHOCYTES # BLD: 1.7 K/UL (ref 1–5.1)
LYMPHOCYTES NFR BLD: 17.1 %
MCH RBC QN AUTO: 31.9 PG (ref 26–34)
MCHC RBC AUTO-ENTMCNC: 34.2 G/DL (ref 31–36)
MCV RBC AUTO: 93.4 FL (ref 80–100)
METHADONE UR QL SCN>300 NG/ML: ABNORMAL
MONOCYTES # BLD: 1 K/UL (ref 0–1.3)
MONOCYTES NFR BLD: 10.4 %
NEUTROPHILS # BLD: 7 K/UL (ref 1.7–7.7)
NEUTROPHILS NFR BLD: 69.7 %
OPIATES UR QL SCN>300 NG/ML: ABNORMAL
OXYCODONE UR QL SCN: POSITIVE
PCP UR QL SCN>25 NG/ML: ABNORMAL
PH UR STRIP: 7 [PH]
PLATELET # BLD AUTO: 177 K/UL (ref 135–450)
PMV BLD AUTO: 7.8 FL (ref 5–10.5)
POTASSIUM SERPL-SCNC: 3.9 MMOL/L (ref 3.5–5.1)
PROT SERPL-MCNC: 6.8 G/DL (ref 6.4–8.2)
RBC # BLD AUTO: 4.51 M/UL (ref 4–5.2)
SODIUM SERPL-SCNC: 137 MMOL/L (ref 136–145)
TROPONIN, HIGH SENSITIVITY: <6 NG/L (ref 0–14)
TROPONIN, HIGH SENSITIVITY: <6 NG/L (ref 0–14)
WBC # BLD AUTO: 10 K/UL (ref 4–11)

## 2023-06-06 PROCEDURE — 93010 ELECTROCARDIOGRAM REPORT: CPT | Performed by: INTERNAL MEDICINE

## 2023-06-06 PROCEDURE — 70450 CT HEAD/BRAIN W/O DYE: CPT

## 2023-06-06 PROCEDURE — 85025 COMPLETE CBC W/AUTO DIFF WBC: CPT

## 2023-06-06 PROCEDURE — 80053 COMPREHEN METABOLIC PANEL: CPT

## 2023-06-06 PROCEDURE — 6370000000 HC RX 637 (ALT 250 FOR IP): Performed by: STUDENT IN AN ORGANIZED HEALTH CARE EDUCATION/TRAINING PROGRAM

## 2023-06-06 PROCEDURE — 80307 DRUG TEST PRSMV CHEM ANLYZR: CPT

## 2023-06-06 PROCEDURE — 6360000002 HC RX W HCPCS: Performed by: STUDENT IN AN ORGANIZED HEALTH CARE EDUCATION/TRAINING PROGRAM

## 2023-06-06 PROCEDURE — 82077 ASSAY SPEC XCP UR&BREATH IA: CPT

## 2023-06-06 PROCEDURE — 84484 ASSAY OF TROPONIN QUANT: CPT

## 2023-06-06 RX ORDER — ACETAMINOPHEN 500 MG
1000 TABLET ORAL ONCE
Status: COMPLETED | OUTPATIENT
Start: 2023-06-06 | End: 2023-06-06

## 2023-06-06 RX ORDER — LORAZEPAM 1 MG/1
1 TABLET ORAL ONCE
Status: COMPLETED | OUTPATIENT
Start: 2023-06-06 | End: 2023-06-06

## 2023-06-06 RX ORDER — LORAZEPAM 0.5 MG/1
0.5 TABLET ORAL ONCE
Status: COMPLETED | OUTPATIENT
Start: 2023-06-06 | End: 2023-06-06

## 2023-06-06 RX ORDER — METOCLOPRAMIDE HYDROCHLORIDE 5 MG/ML
10 INJECTION INTRAMUSCULAR; INTRAVENOUS ONCE
Status: COMPLETED | OUTPATIENT
Start: 2023-06-06 | End: 2023-06-06

## 2023-06-06 RX ADMIN — LORAZEPAM 0.5 MG: 0.5 TABLET ORAL at 02:14

## 2023-06-06 RX ADMIN — ACETAMINOPHEN 1000 MG: 500 TABLET ORAL at 00:26

## 2023-06-06 RX ADMIN — LORAZEPAM 1 MG: 1 TABLET ORAL at 00:27

## 2023-06-06 RX ADMIN — METOCLOPRAMIDE 10 MG: 5 INJECTION, SOLUTION INTRAMUSCULAR; INTRAVENOUS at 00:28

## 2023-06-06 ASSESSMENT — PAIN SCALES - GENERAL: PAINLEVEL_OUTOF10: 7

## 2023-06-06 NOTE — ED PROVIDER NOTES
CT Head W/O Contrast    Result Date: 6/6/2023  EXAMINATION: CT OF THE HEAD WITHOUT CONTRAST  6/6/2023 12:20 am TECHNIQUE: CT of the head was performed without the administration of intravenous contrast. Automated exposure control, iterative reconstruction, and/or weight based adjustment of the mA/kV was utilized to reduce the radiation dose to as low as reasonably achievable. COMPARISON: 11/15/2017. HISTORY: ORDERING SYSTEM PROVIDED HISTORY: headache TECHNOLOGIST PROVIDED HISTORY: Reason for exam:->headache Has a \"code stroke\" or \"stroke alert\" been called? ->No Decision Support Exception - unselect if not a suspected or confirmed emergency medical condition->Emergency Medical Condition (MA) Is the patient pregnant?->No Reason for Exam: headache FINDINGS: BRAIN/VENTRICLES: There is no acute intracranial hemorrhage, mass effect or midline shift. No abnormal extra-axial fluid collection. The gray-white differentiation is maintained without evidence of an acute infarct. There is no evidence of hydrocephalus. No basilar cistern or sulcal effacement. Minimal intracranial atherosclerosis. ORBITS: The visualized portion of the orbits demonstrate no acute abnormality. SINUSES: Mild chronic paranasal sinus mucoperiosteal thickening. Mastoid air cells are well aerated. SOFT TISSUES/SKULL:  No acute abnormality of the visualized skull or soft tissues. 1. No acute intracranial abnormality. 2. Mild chronic paranasal sinus disease. XR CHEST PORTABLE    Result Date: 6/6/2023  EXAMINATION: ONE XRAY VIEW OF THE CHEST 6/5/2023 11:39 pm COMPARISON: 05/03/2023 HISTORY: ORDERING SYSTEM PROVIDED HISTORY: chest pain TECHNOLOGIST PROVIDED HISTORY: Reason for exam:->chest pain Reason for Exam: chest pain, sob FINDINGS: Cardiac and mediastinal silhouettes appear within normal limits for size. Mild elevation the right hemidiaphragm. No focal consolidation or pleural effusion.   Mild prominent haziness at the lower lung

## 2023-06-06 NOTE — ED TRIAGE NOTES
Pt presents to ED via EMS from [facility] with a c/o chest pain that started around 1800 tonight. Pt reports hx of MI and blood clots. Pt states she had an MI in May and is being treated for blood clots in her lungs currently. Pt states she has been taking Eliquis for PE. Pt also states she's on a hold a [facility] for SI but pt is denying SI at this moment. Pt is NSR upon arrival. Pt denies wearing O2 at home. VS stable.

## 2023-06-06 NOTE — ED NOTES
Provider order placed for patient's discharge. Provider reviewed decision to discharge with the patient. Discharge paperwork and any prescriptions were reviewed with the patient. Patient verbalized understanding of discharge education and any prescriptions and has no further questions or further needs at this time. Patient left with all personal belongings and was stable upon departure. Patient thanked for choosing [facility] ([facility]) and informed to return should any need arise.        Fatou Mon RN  06/06/23 4242

## 2023-11-16 ENCOUNTER — ANESTHESIA (OUTPATIENT)
Dept: ENDOSCOPY | Age: 48
End: 2023-11-16
Payer: MEDICARE

## 2023-11-16 ENCOUNTER — ANESTHESIA EVENT (OUTPATIENT)
Dept: ENDOSCOPY | Age: 48
End: 2023-11-16
Payer: MEDICARE

## 2023-11-16 ENCOUNTER — HOSPITAL ENCOUNTER (OUTPATIENT)
Age: 48
Setting detail: OUTPATIENT SURGERY
Discharge: HOME | End: 2023-11-16
Attending: INTERNAL MEDICINE | Admitting: INTERNAL MEDICINE
Payer: MEDICARE

## 2023-11-16 VITALS
OXYGEN SATURATION: 95 % | RESPIRATION RATE: 16 BRPM | HEIGHT: 64 IN | HEART RATE: 69 BPM | SYSTOLIC BLOOD PRESSURE: 102 MMHG | DIASTOLIC BLOOD PRESSURE: 64 MMHG | BODY MASS INDEX: 46.1 KG/M2 | WEIGHT: 270 LBS | TEMPERATURE: 97.1 F

## 2023-11-16 DIAGNOSIS — Z12.11 COLON CANCER SCREENING: ICD-10-CM

## 2023-11-16 DIAGNOSIS — K22.70 BARRETT'S ESOPHAGUS WITHOUT DYSPLASIA: ICD-10-CM

## 2023-11-16 LAB
GLUCOSE BLD-MCNC: 228 MG/DL (ref 70–99)
HCG UR QL: NEGATIVE
PERFORMED ON: ABNORMAL

## 2023-11-16 PROCEDURE — 84703 CHORIONIC GONADOTROPIN ASSAY: CPT

## 2023-11-16 PROCEDURE — 3700000000 HC ANESTHESIA ATTENDED CARE: Performed by: INTERNAL MEDICINE

## 2023-11-16 PROCEDURE — 88305 TISSUE EXAM BY PATHOLOGIST: CPT

## 2023-11-16 PROCEDURE — 2709999900 HC NON-CHARGEABLE SUPPLY: Performed by: INTERNAL MEDICINE

## 2023-11-16 PROCEDURE — 2580000003 HC RX 258: Performed by: ANESTHESIOLOGY

## 2023-11-16 PROCEDURE — 88342 IMHCHEM/IMCYTCHM 1ST ANTB: CPT

## 2023-11-16 PROCEDURE — 7100000010 HC PHASE II RECOVERY - FIRST 15 MIN: Performed by: INTERNAL MEDICINE

## 2023-11-16 PROCEDURE — 3700000001 HC ADD 15 MINUTES (ANESTHESIA): Performed by: INTERNAL MEDICINE

## 2023-11-16 PROCEDURE — 2500000003 HC RX 250 WO HCPCS: Performed by: NURSE ANESTHETIST, CERTIFIED REGISTERED

## 2023-11-16 PROCEDURE — 7100000011 HC PHASE II RECOVERY - ADDTL 15 MIN: Performed by: INTERNAL MEDICINE

## 2023-11-16 PROCEDURE — 3609027000 HC COLONOSCOPY: Performed by: INTERNAL MEDICINE

## 2023-11-16 PROCEDURE — 6360000002 HC RX W HCPCS: Performed by: NURSE ANESTHETIST, CERTIFIED REGISTERED

## 2023-11-16 PROCEDURE — 3609012400 HC EGD TRANSORAL BIOPSY SINGLE/MULTIPLE: Performed by: INTERNAL MEDICINE

## 2023-11-16 RX ORDER — INSULIN ASPART 100 [IU]/ML
10 INJECTION, SOLUTION INTRAVENOUS; SUBCUTANEOUS 4 TIMES DAILY
COMMUNITY

## 2023-11-16 RX ORDER — OMEPRAZOLE 20 MG/1
20 CAPSULE, DELAYED RELEASE ORAL DAILY
COMMUNITY

## 2023-11-16 RX ORDER — SODIUM CHLORIDE, SODIUM LACTATE, POTASSIUM CHLORIDE, CALCIUM CHLORIDE 600; 310; 30; 20 MG/100ML; MG/100ML; MG/100ML; MG/100ML
INJECTION, SOLUTION INTRAVENOUS CONTINUOUS
Status: DISCONTINUED | OUTPATIENT
Start: 2023-11-16 | End: 2023-11-16 | Stop reason: HOSPADM

## 2023-11-16 RX ORDER — CHLORPROMAZINE HYDROCHLORIDE 25 MG/1
25 TABLET, FILM COATED ORAL 3 TIMES DAILY
COMMUNITY

## 2023-11-16 RX ORDER — LIDOCAINE HYDROCHLORIDE 10 MG/ML
1 INJECTION, SOLUTION EPIDURAL; INFILTRATION; INTRACAUDAL; PERINEURAL
Status: DISCONTINUED | OUTPATIENT
Start: 2023-11-16 | End: 2023-11-16 | Stop reason: HOSPADM

## 2023-11-16 RX ORDER — PROPOFOL 10 MG/ML
INJECTION, EMULSION INTRAVENOUS PRN
Status: DISCONTINUED | OUTPATIENT
Start: 2023-11-16 | End: 2023-11-16 | Stop reason: SDUPTHER

## 2023-11-16 RX ORDER — SODIUM CHLORIDE 0.9 % (FLUSH) 0.9 %
5-40 SYRINGE (ML) INJECTION EVERY 12 HOURS SCHEDULED
Status: DISCONTINUED | OUTPATIENT
Start: 2023-11-16 | End: 2023-11-16 | Stop reason: HOSPADM

## 2023-11-16 RX ORDER — NALTREXONE HYDROCHLORIDE 50 MG/1
50 TABLET, FILM COATED ORAL DAILY
COMMUNITY

## 2023-11-16 RX ORDER — SODIUM CHLORIDE 0.9 % (FLUSH) 0.9 %
5-40 SYRINGE (ML) INJECTION PRN
Status: DISCONTINUED | OUTPATIENT
Start: 2023-11-16 | End: 2023-11-16 | Stop reason: HOSPADM

## 2023-11-16 RX ORDER — PROPOFOL 10 MG/ML
INJECTION, EMULSION INTRAVENOUS CONTINUOUS PRN
Status: DISCONTINUED | OUTPATIENT
Start: 2023-11-16 | End: 2023-11-16 | Stop reason: SDUPTHER

## 2023-11-16 RX ORDER — LIDOCAINE HYDROCHLORIDE 20 MG/ML
INJECTION, SOLUTION EPIDURAL; INFILTRATION; INTRACAUDAL; PERINEURAL PRN
Status: DISCONTINUED | OUTPATIENT
Start: 2023-11-16 | End: 2023-11-16 | Stop reason: SDUPTHER

## 2023-11-16 RX ORDER — INSULIN DETEMIR 100 [IU]/ML
25 INJECTION, SOLUTION SUBCUTANEOUS 2 TIMES DAILY
COMMUNITY

## 2023-11-16 RX ORDER — BUPROPION HYDROCHLORIDE 150 MG/1
150 TABLET ORAL EVERY MORNING
COMMUNITY

## 2023-11-16 RX ADMIN — PROPOFOL 50 MG: 10 INJECTION, EMULSION INTRAVENOUS at 13:22

## 2023-11-16 RX ADMIN — PROPOFOL 150 MCG/KG/MIN: 10 INJECTION, EMULSION INTRAVENOUS at 13:20

## 2023-11-16 RX ADMIN — SODIUM CHLORIDE, POTASSIUM CHLORIDE, SODIUM LACTATE AND CALCIUM CHLORIDE: 600; 310; 30; 20 INJECTION, SOLUTION INTRAVENOUS at 12:39

## 2023-11-16 RX ADMIN — PROPOFOL 50 MG: 10 INJECTION, EMULSION INTRAVENOUS at 13:26

## 2023-11-16 RX ADMIN — PROPOFOL 100 MG: 10 INJECTION, EMULSION INTRAVENOUS at 13:20

## 2023-11-16 RX ADMIN — LIDOCAINE HYDROCHLORIDE 50 MG: 20 INJECTION, SOLUTION EPIDURAL; INFILTRATION; INTRACAUDAL; PERINEURAL at 13:20

## 2023-11-16 ASSESSMENT — PAIN - FUNCTIONAL ASSESSMENT: PAIN_FUNCTIONAL_ASSESSMENT: 0-10

## 2023-11-16 ASSESSMENT — PAIN SCALES - GENERAL
PAINLEVEL_OUTOF10: 0

## 2023-11-16 NOTE — DISCHARGE INSTRUCTIONS
home?  Regular exams to look for colon polyps are the best way to prevent polyps from turning into colon cancer. These can include stool tests, sigmoidoscopy, colonoscopy, and CT colonography. Talk with your doctor about a testing schedule that is right for you. To prevent polyps  There is no home treatment that can prevent colon polyps. But these steps may help lower your risk for cancer. Stay active. Being active can help you get to and stay at a healthy weight. Try to exercise on most days of the week. Walking is a good choice. Eat well. Choose a variety of vegetables, fruits, legumes (such as peas and beans), fish, poultry, and whole grains. Do not smoke. If you need help quitting, talk to your doctor about stop-smoking programs and medicines. These can increase your chances of quitting for good. If you drink alcohol, limit how much you drink. Limit alcohol to 2 drinks a day for men and 1 drink a day for women. When should you call for help? Call your doctor now or seek immediate medical care if:    You have severe belly pain. Your stools are maroon or very bloody. Watch closely for changes in your health, and be sure to contact your doctor if:    You have a fever. You have nausea or vomiting. You have a change in bowel habits (new constipation or diarrhea). Your symptoms get worse or are not improving as expected. Diverticulosis: Care Instructions  Overview  In diverticulosis, pouches called diverticula form in the wall of the large intestine (colon). The pouches do not cause any pain or other symptoms. Most people who have diverticulosis do not know they have it. If diverticulosis causes problems, the pouches may:  Bleed (diverticular bleeding). Become infected (diverticulitis). Diverticula form when pressure pushes the wall of the colon outward at certain weak points. A diet that is too low in fiber can cause diverticula.   Follow-up care is a key part of your treatment and safety. Be sure to make and go to all appointments, and call your doctor if you are having problems. It's also a good idea to know your test results and keep a list of the medicines you take. How can you care for yourself at home? Include fruits, leafy green vegetables, beans, and whole grains in your diet each day. These foods are high in fiber. Take a fiber supplement (such as Citrucel or Metamucil) every day if needed. Read and follow all instructions on the label. Drink plenty of fluids. If you have kidney, heart, or liver disease and have to limit fluids, talk with your doctor before you increase the amount of fluids you drink. Get at least 30 minutes of exercise on most days of the week. Walking is a good choice. You also may want to do other activities, such as running, swimming, cycling, or playing tennis or team sports. Cut out foods that cause gas, pain, or other symptoms. When should you call for help? Call your doctor now or seek immediate medical care if:    You pass maroon or very bloody stools. You have new or worse belly pain. You have a fever. You have nausea or vomiting. You have diarrhea or constipation. You have unusual changes in your bowel movements. You have bloating. You cannot pass stools or gas. Watch closely for changes in your health, and be sure to contact your doctor if you have any problems.

## 2023-11-16 NOTE — PROGRESS NOTES
Ambulatory Surgery/Procedure Discharge Note    Vitals:    11/16/23 1415   BP: 102/64   Pulse: 69   Resp: 16   Temp:    SpO2: 95%       No intake/output data recorded. Restroom use offered before discharge. Yes    Pain assessment:  level of pain (1-10, 10 severe)  Pain Level: 0  Pt to Endoscopy recovery post EGD, Colonoscopy. Pt denies pain at this time. Pt denies nausea at this time, pt tolerating PO fluids well. Upon arrival to unit pt noted to have cough with pt spitting up phlegm, cough noted to decrease prior to discharge. This writer offered to have cough drop ordered, pt declined. Discharge instructions given to pt's  and she states understanding of these instructions. Pt and pt's  that pt is \"ready to go. \"         Patient discharged to home/self care.  Patient discharged via wheel chair by transporter to waiting family/S.O.       11/16/2023 3:08 PM

## 2023-11-16 NOTE — PROCEDURES
EGD/COLONOSCOPY PROCEDURE NOTE:        Patient: Jose Manuel Chandler  : 1975  Acct#:     Procedure:   Esophagogastroduodenoscopy with biopsy  Colonoscopy with biopsy        Date:  2023     Surgeon:  Rupinder Todd MD,     Referring Physician:  Nik Kathleen      Preoperative Diagnosis:    52-year-old female with history of chronic reflux for EGD exam today  Patient is also here for screening colonoscopy      Anesthesia: MAC anesthesia      Consent:  The patient or their legal guardian has signed an informed consent, and is aware of the potential risks, benefits, alternatives, and potential complications of this procedure. These include, but are not limited to hemorrhage, bleeding, post procedural pain, perforation, phlebitis, aspiration, hypotension, hypoxia, cardiovascular events such as arryhthmia, and possibly death. EGD PROCEDURE NOTE        Description of Procedure: The patient was then taken to the endoscopy suite, placed in the left lateral decubitus position and the above IV sedation was administrered. The Olympus video endoscope was placed through the patient's oropharynx without difficulty to the extent of the 2nd portion of the duodenum. The patient tolerated the procedure well and was taken to the post anesthesia care unit in good condition. Complications: None  EBL: none      Findings:  Esophagus:  Visualization of the esophagus demonstrated normal mucosa. GE junction at 35 cm with 2 cm sliding hiatal hernia. Irregular Z-line with ultrashort segment, 5 mm, single tongue of salmon-colored mucosa seen, biopsies obtained rule out Welch's esophagus. Stomach: The scope was then advanced into the stomach. Both forward and retroflexed views of the stomach were obtained. Visualization of the gastric body and antrum demonstrated mild erythematous mucosa, biopsies obtained. A retroflexed exam of the gastric cardia and fundus demonstrated normal mucosa. Mild sigmoid diverticulosis. Retroflexion in the rectum revealed small internal hemorrhoids. Impression:    3 mm polyp in the cecum, removed by cold biopsy forceps. Mild sigmoid diverticulosis            Recommendations:     Follow-up pathology results  Continue pantoprazole  Diet and lifestyle instructions for GERD as discussed  Recommend surveillance colonoscopy in 5 years, if the polyp pathology is adenoma  Follow-up in the office as scheduled          Ofe Babin MD  [address] [place] [address]  [phone]    11/16/2023

## 2023-11-16 NOTE — H&P
Pre-operative History and Physical    Patient: John Eric  : 1975  Acct#:     History Obtained From:  patient    HISTORY OF PRESENT ILLNESS:    The patient is a 53 y.o. female  who presents with GERD, screening colonoscopy    Past Medical History:        Diagnosis Date    Arthritis     Asthma     Bipolar disorder ([facility])     Borderline personality disorder ([facility])     CAP (community acquired pneumonia) 2015    Chronic kidney disease     proteinuria    Depression     Diabetes mellitus ([facility])     Drug abuse ([facility])     GERD (gastroesophageal reflux disease)     Headache     Hyperlipidemia     Influenza A 2014    Morbid obesity ([facility])     Narcotic addiction ([facility])     Pain management     not since     Pseudotumor cerebri     dx 3/12 by neurologist. OP [id number]    Pulmonary emboli ([facility]) 2023    bilateral    Sleep apnea     no CPAP    Suicide ideation     chronic    Wears glasses      Past Surgical History:        Procedure Laterality Date    CHOLECYSTECTOMY      COLONOSCOPY      17 YRS AGO ,NORMAL    CYSTOSCOPY      CYSTOSCOPY      CYSTOSCOPY, URETHRAL DILATATION      DILATION AND CURETTAGE OF UTERUS      ENDOMETRIAL ABLATION      ENDOSCOPY, COLON, DIAGNOSTIC      FRACTURE SURGERY      femur, left    KNEE SURGERY      TONSILLECTOMY      TUBAL LIGATION      UPPER GASTROINTESTINAL ENDOSCOPY      DONE X2 GERD     Medications Prior to Admission:   No current facility-administered medications on file prior to encounter.      Current Outpatient Medications on File Prior to Encounter   Medication Sig Dispense Refill    omeprazole (PRILOSEC) 20 MG delayed release capsule Take 1 capsule by mouth daily      insulin aspart (NOVOLOG FLEXPEN) 100 UNIT/ML injection pen Inject 10 Units into the skin in the morning, at noon, in the evening, and at bedtime Plus sliding scale prn      insulin detemir (LEVEMIR) 100 UNIT/ML injection vial Inject 25 Units into the skin 2 times daily

## 2023-11-17 NOTE — ANESTHESIA POSTPROCEDURE EVALUATION
Department of Anesthesiology  Postprocedure Note    Patient: Joan Alegre  MRN: [id number]  YOB: 1975  Date of evaluation: 11/16/2023      Procedure Summary       Date: 11/16/23 Room / Location: [facility] / [facility] [address] [facility]    Anesthesia Start: 6206 Anesthesia Stop: 4710    Procedures:       ESOPHAGOGASTRODUODENOSCOPY      COLONOSCOPY Diagnosis:       Colon cancer screening      Welch's esophagus without dysplasia      (Colon cancer screening [Z12.11])      (Welch's esophagus without dysplasia [K22.70])    Surgeons: Dali Rutledge MD Responsible Provider: Dianna Lorenz MD    Anesthesia Type: MAC ASA Status: 3            Anesthesia Type: No value filed.     Antonio Phase I: Antonio Score: 10    Antonio Phase II: Antonio Score: 10      Anesthesia Post Evaluation    Patient location during evaluation: PACU  Patient participation: complete - patient participated  Level of consciousness: awake and alert  Airway patency: patent  Nausea & Vomiting: no nausea and no vomiting  Cardiovascular status: blood pressure returned to baseline  Respiratory status: acceptable  Hydration status: euvolemic  Pain management: adequate

## 2024-01-21 ENCOUNTER — APPOINTMENT (OUTPATIENT)
Dept: GENERAL RADIOLOGY | Age: 49
End: 2024-01-21
Payer: COMMERCIAL

## 2024-01-21 ENCOUNTER — HOSPITAL ENCOUNTER (EMERGENCY)
Age: 49
Discharge: HOME | End: 2024-01-21
Attending: EMERGENCY MEDICINE
Payer: COMMERCIAL

## 2024-01-21 VITALS
WEIGHT: 289.24 LBS | HEIGHT: 64 IN | DIASTOLIC BLOOD PRESSURE: 69 MMHG | TEMPERATURE: 98 F | BODY MASS INDEX: 49.38 KG/M2 | RESPIRATION RATE: 16 BRPM | SYSTOLIC BLOOD PRESSURE: 132 MMHG | HEART RATE: 84 BPM | OXYGEN SATURATION: 95 %

## 2024-01-21 DIAGNOSIS — R73.9 HYPERGLYCEMIA: Primary | ICD-10-CM

## 2024-01-21 LAB
ANION GAP SERPL CALCULATED.3IONS-SCNC: 14 MMOL/L (ref 3–16)
BACTERIA URNS QL MICRO: ABNORMAL /HPF
BASE EXCESS BLDV CALC-SCNC: 1.2 MMOL/L (ref -3–3)
BASOPHILS # BLD: 0.1 K/UL (ref 0–0.2)
BASOPHILS NFR BLD: 1.5 %
BETA-HYDROXYBUTYRATE: 0.09 MMOL/L (ref 0–0.27)
BILIRUB UR QL STRIP.AUTO: NEGATIVE
BUN SERPL-MCNC: 9 MG/DL (ref 7–20)
CALCIUM SERPL-MCNC: 9.2 MG/DL (ref 8.3–10.6)
CHLORIDE SERPL-SCNC: 99 MMOL/L (ref 99–110)
CLARITY UR: ABNORMAL
CO2 BLDV-SCNC: 26 MMOL/L
CO2 SERPL-SCNC: 28 MMOL/L (ref 21–32)
COLOR UR: YELLOW
CREAT SERPL-MCNC: 0.7 MG/DL (ref 0.6–1.1)
DEPRECATED RDW RBC AUTO: 13.6 % (ref 12.4–15.4)
EOSINOPHIL # BLD: 0.8 K/UL (ref 0–0.6)
EOSINOPHIL NFR BLD: 10.5 %
EPI CELLS #/AREA URNS HPF: ABNORMAL /HPF (ref 0–5)
GFR SERPLBLD CREATININE-BSD FMLA CKD-EPI: >60 ML/MIN/{1.73_M2}
GLUCOSE BLD-MCNC: 330 MG/DL (ref 70–99)
GLUCOSE SERPL-MCNC: 349 MG/DL (ref 70–99)
GLUCOSE UR STRIP.AUTO-MCNC: >=1000 MG/DL
HCO3 BLDV-SCNC: 26.5 MMOL/L (ref 23–29)
HCT VFR BLD AUTO: 39.1 % (ref 36–48)
HGB BLD-MCNC: 13.3 G/DL (ref 12–16)
HGB UR QL STRIP.AUTO: ABNORMAL
KETONES UR STRIP.AUTO-MCNC: ABNORMAL MG/DL
LEUKOCYTE ESTERASE UR QL STRIP.AUTO: NEGATIVE
LYMPHOCYTES # BLD: 1.6 K/UL (ref 1–5.1)
LYMPHOCYTES NFR BLD: 21.5 %
MCH RBC QN AUTO: 30.8 PG (ref 26–34)
MCHC RBC AUTO-ENTMCNC: 34.1 G/DL (ref 31–36)
MCV RBC AUTO: 90.2 FL (ref 80–100)
MONOCYTES # BLD: 0.6 K/UL (ref 0–1.3)
MONOCYTES NFR BLD: 7.8 %
NEUTROPHILS # BLD: 4.5 K/UL (ref 1.7–7.7)
NEUTROPHILS NFR BLD: 58.7 %
NITRITE UR QL STRIP.AUTO: NEGATIVE
O2 THERAPY: ABNORMAL
PCO2 BLDV: 46 MMHG (ref 40–50)
PERFORMED ON: ABNORMAL
PH BLDV: 7.37 [PH] (ref 7.35–7.45)
PH UR STRIP.AUTO: 6 [PH] (ref 5–8)
PLATELET # BLD AUTO: 282 K/UL (ref 135–450)
PMV BLD AUTO: 8 FL (ref 5–10.5)
PO2 BLDV: 52.8 MMHG (ref 25–40)
POTASSIUM SERPL-SCNC: 4.1 MMOL/L (ref 3.5–5.1)
PROT UR STRIP.AUTO-MCNC: 100 MG/DL
RBC # BLD AUTO: 4.33 M/UL (ref 4–5.2)
RBC #/AREA URNS HPF: ABNORMAL /HPF (ref 0–4)
SAO2 % BLDV: 86 %
SODIUM SERPL-SCNC: 141 MMOL/L (ref 136–145)
SP GR UR STRIP.AUTO: >=1.03 (ref 1–1.03)
UA COMPLETE W REFLEX CULTURE PNL UR: ABNORMAL
UA DIPSTICK W REFLEX MICRO PNL UR: YES
URN SPEC COLLECT METH UR: ABNORMAL
UROBILINOGEN UR STRIP-ACNC: 0.2 E.U./DL
WBC # BLD AUTO: 7.6 K/UL (ref 4–11)
WBC #/AREA URNS HPF: ABNORMAL /HPF (ref 0–5)

## 2024-01-21 PROCEDURE — 82010 KETONE BODYS QUAN: CPT

## 2024-01-21 PROCEDURE — 80048 BASIC METABOLIC PNL TOTAL CA: CPT

## 2024-01-21 PROCEDURE — 82803 BLOOD GASES ANY COMBINATION: CPT

## 2024-01-21 PROCEDURE — 85025 COMPLETE CBC W/AUTO DIFF WBC: CPT

## 2024-01-21 PROCEDURE — 36415 COLL VENOUS BLD VENIPUNCTURE: CPT

## 2024-01-21 PROCEDURE — 99284 EMERGENCY DEPT VISIT MOD MDM: CPT

## 2024-01-21 PROCEDURE — 71046 X-RAY EXAM CHEST 2 VIEWS: CPT

## 2024-01-21 PROCEDURE — 81001 URINALYSIS AUTO W/SCOPE: CPT

## 2024-01-21 ASSESSMENT — PAIN SCALES - GENERAL
PAINLEVEL_OUTOF10: 0

## 2024-01-21 NOTE — ED NOTES
Pt resting in bed comfortably with hob elevated.  No s/s or c/o pain or distress noted or reported.  Respirations easy and even.  Call light in reach, will continue to monitor closely.

## 2024-01-21 NOTE — ED PROVIDER NOTES
[facility]     EMERGENCY DEPARTMENT ENCOUNTER            Pt Name: Gita Villalpando   MRN: [id number]   Birthdate 1975   Date of evaluation: 1/21/2024   Provider: Hardik Madrigal MD   PCP: Cristela Martini   Note Started: 5:02 PM EST 1/21/24          CHIEF COMPLAINT     Chief Complaint   Patient presents with    Hyperglycemia       Pt to ED with c/o hyperglycemia.  Pt states she was recently discharged from inpatient psych, states her blood sugars have been high since then.  Hx of type 2 DM.                 HISTORY OF PRESENT ILLNESS:   History from : Patient   Limitations to history : None     Gita Villalpando is a 48 y.o. female who presents with hyperglycemia.  Is a pleasant 48-year-old  female with severe type 2 diabetes requiring insulin supplementation who comes in with refractory hypoglycemia for actually several weeks.  She recently had a psychiatric admission and comes in with an elevated fingerstick she says her reading at home was high.  She denies any fevers, chills or systemic signs or symptoms of infection.  No urgency dysuria frequency    Nursing Notes were all reviewed and agreed with, or any disagreements were addressed in the HPI.     REVIEW OF SYSTEMS :    Positives and Pertinent negatives as per HPI.      MEDICAL HISTORY   has a past medical history of Arthritis, Asthma, Bipolar disorder ([facility]), Borderline personality disorder (HCC), CAP (community acquired pneumonia) (09/29/2015), Chronic kidney disease, Depression, Diabetes mellitus ([facility]), Drug abuse (HCC), GERD (gastroesophageal reflux disease), Headache, Hyperlipidemia, Influenza A (12/31/2014), Morbid obesity ([facility]), Narcotic addiction ([facility]), Pain management, Pseudotumor cerebri, Pulmonary emboli ([facility]) (05/2023), Sleep apnea, Suicide ideation, and Wears glasses.    Past Surgical History:   Procedure Laterality Date    CHOLECYSTECTOMY      COLONOSCOPY      17 YRS AGO ,NORMAL    COLONOSCOPY

## 2024-01-21 NOTE — ED NOTES
Pt to ED with c/o hyperglycemia.  Pt states she was recently admitted to  psychiatric unit, states her blood sugars have been high since then.  Hx of type 2 DM. Pt denies any increased thirst or increased urination. Pt denies any recent suicidal/homicidal thoughts or ideations.  POC glucose 330.

## 2024-01-21 NOTE — DISCHARGE INSTRUCTIONS
1.  Continue current medications.  2.  Follow-up with your primary care physician next few days and as soon as possible.  3.  Drink plenty of fluids.  4.  Return emergency department for severe worsening or significant signs of infection

## 2024-02-15 ENCOUNTER — HOSPITAL ENCOUNTER (EMERGENCY)
Age: 49
Discharge: HOME | End: 2024-02-16
Attending: EMERGENCY MEDICINE
Payer: COMMERCIAL

## 2024-02-15 DIAGNOSIS — B37.41 YEAST CYSTITIS: ICD-10-CM

## 2024-02-15 DIAGNOSIS — E11.65 TYPE 2 DIABETES MELLITUS WITH HYPERGLYCEMIA, WITH LONG-TERM CURRENT USE OF INSULIN (HCC): Primary | ICD-10-CM

## 2024-02-15 DIAGNOSIS — Z79.4 TYPE 2 DIABETES MELLITUS WITH HYPERGLYCEMIA, WITH LONG-TERM CURRENT USE OF INSULIN (HCC): Primary | ICD-10-CM

## 2024-02-15 LAB
BACTERIA URNS QL MICRO: ABNORMAL /HPF
BASE EXCESS BLDV CALC-SCNC: -0.5 MMOL/L (ref -3–3)
BASOPHILS # BLD: 0 K/UL (ref 0–0.2)
BASOPHILS NFR BLD: 0.5 %
BILIRUB UR QL STRIP.AUTO: NEGATIVE
CLARITY UR: CLEAR
CO2 BLDV-SCNC: 24 MMOL/L
COLOR UR: YELLOW
DEPRECATED RDW RBC AUTO: 13.9 % (ref 12.4–15.4)
EOSINOPHIL # BLD: 0.2 K/UL (ref 0–0.6)
EOSINOPHIL NFR BLD: 2.6 %
EPI CELLS #/AREA URNS HPF: ABNORMAL /HPF (ref 0–5)
GLUCOSE BLD-MCNC: 319 MG/DL (ref 70–99)
GLUCOSE UR STRIP.AUTO-MCNC: >=1000 MG/DL
HCO3 BLDV-SCNC: 24.9 MMOL/L (ref 23–29)
HCT VFR BLD AUTO: 44.1 % (ref 36–48)
HGB BLD-MCNC: 14.7 G/DL (ref 12–16)
HGB UR QL STRIP.AUTO: ABNORMAL
KETONES UR STRIP.AUTO-MCNC: NEGATIVE MG/DL
LEUKOCYTE ESTERASE UR QL STRIP.AUTO: NEGATIVE
LYMPHOCYTES # BLD: 1.9 K/UL (ref 1–5.1)
LYMPHOCYTES NFR BLD: 21.6 %
MCH RBC QN AUTO: 30.6 PG (ref 26–34)
MCHC RBC AUTO-ENTMCNC: 33.4 G/DL (ref 31–36)
MCV RBC AUTO: 91.7 FL (ref 80–100)
MONOCYTES # BLD: 0.7 K/UL (ref 0–1.3)
MONOCYTES NFR BLD: 7.7 %
NEUTROPHILS # BLD: 5.8 K/UL (ref 1.7–7.7)
NEUTROPHILS NFR BLD: 67.6 %
NITRITE UR QL STRIP.AUTO: NEGATIVE
O2 THERAPY: ABNORMAL
PCO2 BLDV: 43.3 MMHG (ref 40–50)
PERFORMED ON: ABNORMAL
PH BLDV: 7.37 [PH] (ref 7.35–7.45)
PH UR STRIP.AUTO: 6 [PH] (ref 5–8)
PLATELET # BLD AUTO: 231 K/UL (ref 135–450)
PMV BLD AUTO: 7.9 FL (ref 5–10.5)
PO2 BLDV: 43.1 MMHG (ref 25–40)
PROT UR STRIP.AUTO-MCNC: 100 MG/DL
RBC # BLD AUTO: 4.81 M/UL (ref 4–5.2)
RBC #/AREA URNS HPF: ABNORMAL /HPF (ref 0–4)
SAO2 % BLDV: 77 %
SP GR UR STRIP.AUTO: 1.02 (ref 1–1.03)
UA COMPLETE W REFLEX CULTURE PNL UR: ABNORMAL
UA DIPSTICK W REFLEX MICRO PNL UR: YES
URN SPEC COLLECT METH UR: ABNORMAL
UROBILINOGEN UR STRIP-ACNC: 0.2 E.U./DL
WBC # BLD AUTO: 8.6 K/UL (ref 4–11)
WBC #/AREA URNS HPF: ABNORMAL /HPF (ref 0–5)
YEAST URNS QL MICRO: PRESENT /HPF

## 2024-02-15 PROCEDURE — 6370000000 HC RX 637 (ALT 250 FOR IP): Performed by: EMERGENCY MEDICINE

## 2024-02-15 PROCEDURE — 85025 COMPLETE CBC W/AUTO DIFF WBC: CPT

## 2024-02-15 PROCEDURE — 2580000003 HC RX 258: Performed by: EMERGENCY MEDICINE

## 2024-02-15 PROCEDURE — 99284 EMERGENCY DEPT VISIT MOD MDM: CPT

## 2024-02-15 PROCEDURE — 80053 COMPREHEN METABOLIC PANEL: CPT

## 2024-02-15 PROCEDURE — 81001 URINALYSIS AUTO W/SCOPE: CPT

## 2024-02-15 PROCEDURE — 36415 COLL VENOUS BLD VENIPUNCTURE: CPT

## 2024-02-15 PROCEDURE — 82010 KETONE BODYS QUAN: CPT

## 2024-02-15 PROCEDURE — 96361 HYDRATE IV INFUSION ADD-ON: CPT

## 2024-02-15 PROCEDURE — 82803 BLOOD GASES ANY COMBINATION: CPT

## 2024-02-15 RX ORDER — 0.9 % SODIUM CHLORIDE 0.9 %
1000 INTRAVENOUS SOLUTION INTRAVENOUS ONCE
Status: COMPLETED | OUTPATIENT
Start: 2024-02-15 | End: 2024-02-16

## 2024-02-15 RX ORDER — FLUCONAZOLE 100 MG/1
200 TABLET ORAL ONCE
Status: COMPLETED | OUTPATIENT
Start: 2024-02-16 | End: 2024-02-15

## 2024-02-15 RX ADMIN — SODIUM CHLORIDE 1000 ML: 9 INJECTION, SOLUTION INTRAVENOUS at 23:30

## 2024-02-15 RX ADMIN — FLUCONAZOLE 200 MG: 100 TABLET ORAL at 23:56

## 2024-02-16 VITALS
WEIGHT: 280.65 LBS | OXYGEN SATURATION: 95 % | SYSTOLIC BLOOD PRESSURE: 144 MMHG | TEMPERATURE: 97.7 F | HEART RATE: 99 BPM | HEIGHT: 64 IN | RESPIRATION RATE: 29 BRPM | DIASTOLIC BLOOD PRESSURE: 94 MMHG | BODY MASS INDEX: 47.91 KG/M2

## 2024-02-16 LAB
ALBUMIN SERPL-MCNC: 3.7 G/DL (ref 3.4–5)
ALBUMIN/GLOB SERPL: 1.2 {RATIO} (ref 1.1–2.2)
ALP SERPL-CCNC: 84 U/L (ref 40–129)
ALT SERPL-CCNC: 14 U/L (ref 10–40)
ANION GAP SERPL CALCULATED.3IONS-SCNC: 13 MMOL/L (ref 3–16)
AST SERPL-CCNC: 11 U/L (ref 15–37)
BETA-HYDROXYBUTYRATE: 0.12 MMOL/L (ref 0–0.27)
BILIRUB SERPL-MCNC: <0.2 MG/DL (ref 0–1)
BUN SERPL-MCNC: 15 MG/DL (ref 7–20)
CALCIUM SERPL-MCNC: 9.9 MG/DL (ref 8.3–10.6)
CHLORIDE SERPL-SCNC: 99 MMOL/L (ref 99–110)
CO2 SERPL-SCNC: 22 MMOL/L (ref 21–32)
CREAT SERPL-MCNC: 0.6 MG/DL (ref 0.6–1.1)
GFR SERPLBLD CREATININE-BSD FMLA CKD-EPI: >60 ML/MIN/{1.73_M2}
GLUCOSE BLD-MCNC: 181 MG/DL (ref 70–99)
GLUCOSE SERPL-MCNC: 348 MG/DL (ref 70–99)
PERFORMED ON: ABNORMAL
POTASSIUM SERPL-SCNC: 3.8 MMOL/L (ref 3.5–5.1)
PROT SERPL-MCNC: 6.7 G/DL (ref 6.4–8.2)
SODIUM SERPL-SCNC: 134 MMOL/L (ref 136–145)

## 2024-02-16 PROCEDURE — 6370000000 HC RX 637 (ALT 250 FOR IP): Performed by: EMERGENCY MEDICINE

## 2024-02-16 PROCEDURE — 96374 THER/PROPH/DIAG INJ IV PUSH: CPT

## 2024-02-16 RX ADMIN — INSULIN HUMAN 5 UNITS: 100 INJECTION, SOLUTION PARENTERAL at 00:25

## 2024-02-16 NOTE — DISCHARGE INSTRUCTIONS
Call today or tomorrow to follow up with Cristela Martini  in 3-4 days.    Take your diabetic medication as instructed.  Check your blood sugar 2 - 3 times per day and write down the results to take to your physician.    Return to the Emergency Department for worsening of blood sugar, excessive nausea or vomiting, low blood sugar, any other care or concern.

## 2024-02-16 NOTE — ED NOTES
Discharge and education instructions reviewed. Patient verbalized understanding, teach-back successful. Patient denied questions at this time. No acute distress noted. Patient instructed to follow-up as noted - return to emergency department if symptoms worsen. Patient verbalized understanding. Discharged per EDMD with discharge instructions.    
Patient has activated call light for concerns that her IV has stopped working.  IV site now shows signs of infiltration that was not present earlier.  IV fluids stopped and IV removed.  Spoke with provider who does not want another IV placed to complete fluids and plan is to update patient and discharge.  
regular

## 2024-02-16 NOTE — ED PROVIDER NOTES
(05/2023), Sleep apnea, Suicide ideation, and Wears glasses.    Past surgical history:  has a past surgical history that includes Tubal ligation; Dilation and curettage of uterus; Tonsillectomy; Cystoscopy; Endometrial ablation; Cholecystectomy; fracture surgery; Colonoscopy; Upper gastrointestinal endoscopy (2012); Cystocopy (5/816/14); knee surgery; Endoscopy, colon, diagnostic; Upper gastrointestinal endoscopy (N/A, 11/16/2023); and Colonoscopy (N/A, 11/16/2023).      PHYSICAL EXAM:  Vitals:    02/16/24 0031   BP: (!) 144/94   Pulse: 99   Resp: 29   Temp:    SpO2: 95%        CONSTITUTIONAL: AOx4, NAD, cooperative with exam, afebrile   HEAD: normocephalic, atraumatic   EYES: PERRL, EOMI, anicteric sclera   ENT: Moist mucous membranes, uvula midline   NECK: Supple, symmetric, trachea midline   BACK: Symmetric, no deformity   LUNGS: Bilateral breath sounds, CTAB, no rales/ronchi/wheezes   CARDIOVASCULAR: RRR, normal S1/S2, no m/r/g, 2+ pulses throughout   ABDOMEN: Soft, obese abdomen, non-tender, non-distended, +BS   NEUROLOGIC:  MAEx4, 5/5 strength throughout; fine touch sensation intact throughout; normal gait; GCS 15   MUSCULOSKELETAL: No clubbing, cyanosis or edema   SKIN: No rash, pallor or wounds         DIAGNOSTIC RESULTS   LABS:   Labs Reviewed   BLOOD GAS, VENOUS - Abnormal; Notable for the following components:       Result Value    pO2, Col[place] 43.1 (*)     All other components within normal limits   COMPREHENSIVE METABOLIC PANEL W/ REFLEX TO MG FOR LOW K - Abnormal; Notable for the following components:    Sodium 134 (*)     Glucose 348 (*)     AST 11 (*)     All other components within normal limits   URINALYSIS WITH REFLEX TO CULTURE - Abnormal; Notable for the following components:    Glucose, Ur >=1000 (*)     Blood, Urine TRACE-INTACT (*)     Protein,  (*)     All other components within normal limits   MICROSCOPIC URINALYSIS - Abnormal; Notable for the following components:    Bacteria, UA 1+ (*)      Yeast, UA Present (*)     All other components within normal limits   POCT GLUCOSE - Abnormal; Notable for the following components:    POC Glucose 319 (*)     All other components within normal limits   CBC WITH AUTO DIFFERENTIAL   BETA-HYDROXYBUTYRATE   POCT GLUCOSE      When ordered only abnormal lab results are displayed. All other labs were within normal range or not returned as of this dictation.     The Ekg interpreted by me shows  None      RADIOLOGY:    Non-plain film images such as CT, Ultrasound and MRI are read by the radiologist. Plain radiographic images are visualized and preliminarily interpreted by the ED Provider with the below findings:       Interpretation per the Radiologist below, if available at the time of this note:  No orders to display            EMERGENCY DEPARTMENT COURSE and DIFFERENTIAL DIAGNOSIS/MDM:     Vitals:    Vitals:    02/15/24 2343 02/15/24 2358 02/16/24 0013 02/16/24 0031   BP: (!) 143/74 (!) 145/81 (!) 148/80 (!) 144/94   Pulse: (!) 103 97 92 99   Resp: 23 21 24 29   Temp:       TempSrc:       SpO2: 96% 98% 97% 95%   Weight:       Height:            Patient was given the following medications:   Medications   sodium chloride 0.9 % bolus 1,000 mL (0 mLs IntraVENous Stopped 2/16/24 0035)   fluconazole (DIFLUCAN) tablet 200 mg (200 mg Oral Given 2/15/24 2356)   insulin regular (HUMULIN R;NOVOLIN R) injection 5 Units (5 Units IntraVENous Given 2/16/24 0025)          CC/HPI Summary, DDx, ED Course, and Reassessment:   Hyperglycemia, DKA, dehydration, electrolyte abnormality, UTI, viral syndrome, other    Patient seen and evaluated.  History and physical as above.  Nontoxic and afebrile.  Presents complaining of hyperglycemia.  Patient's blood sugar on arrival in the .  Patient mildly tachycardic but is alert and answering questions appropriately.  Denies any chest pain.  Has had nausea without vomiting.  No abdominal tenderness or peritoneal signs on abdominal exam.   Elidel Pregnancy And Lactation Text: This medication is Pregnancy Category C. It is unknown if this medication is excreted in breast milk.

## 2024-02-16 NOTE — ED TRIAGE NOTES
Patient to ED for hyperglycemia for the past 2 days.  Patient is alert and oriented with GCS 15.  Patient hx IDDM, complaint with medications and denies any recent illness.  Patient reports BGL \"hi\" at home,  in ED on our monitor.  Patient has secondary complaint of shortness of breath.  Patient presents in no obvious respiratory distress, no accessory muscle use, but does have tachyphemia.  Patient denies any prior DKA, and has no other complaints at this time.

## 2024-03-08 ENCOUNTER — HOSPITAL ENCOUNTER (EMERGENCY)
Age: 49
Discharge: HOME | End: 2024-03-08
Payer: COMMERCIAL

## 2024-03-08 ENCOUNTER — APPOINTMENT (OUTPATIENT)
Dept: GENERAL RADIOLOGY | Age: 49
End: 2024-03-08
Payer: COMMERCIAL

## 2024-03-08 VITALS
BODY MASS INDEX: 50.02 KG/M2 | TEMPERATURE: 98.1 F | WEIGHT: 293 LBS | DIASTOLIC BLOOD PRESSURE: 97 MMHG | HEART RATE: 96 BPM | SYSTOLIC BLOOD PRESSURE: 172 MMHG | OXYGEN SATURATION: 95 % | RESPIRATION RATE: 18 BRPM | HEIGHT: 64 IN

## 2024-03-08 DIAGNOSIS — M17.11 ARTHRITIS OF RIGHT KNEE: Primary | ICD-10-CM

## 2024-03-08 DIAGNOSIS — M23.8X1: ICD-10-CM

## 2024-03-08 PROCEDURE — 99283 EMERGENCY DEPT VISIT LOW MDM: CPT

## 2024-03-08 PROCEDURE — 73560 X-RAY EXAM OF KNEE 1 OR 2: CPT

## 2024-03-08 RX ORDER — NAPROXEN 500 MG/1
500 TABLET ORAL 2 TIMES DAILY PRN
Qty: 20 TABLET | Refills: 0 | Status: SHIPPED | OUTPATIENT
Start: 2024-03-08 | End: 2024-03-18

## 2024-03-08 ASSESSMENT — LIFESTYLE VARIABLES
HOW MANY STANDARD DRINKS CONTAINING ALCOHOL DO YOU HAVE ON A TYPICAL DAY: PATIENT DOES NOT DRINK
HOW OFTEN DO YOU HAVE A DRINK CONTAINING ALCOHOL: NEVER

## 2024-03-08 ASSESSMENT — PAIN DESCRIPTION - FREQUENCY: FREQUENCY: CONTINUOUS

## 2024-03-08 ASSESSMENT — PAIN - FUNCTIONAL ASSESSMENT
PAIN_FUNCTIONAL_ASSESSMENT: 0-10
PAIN_FUNCTIONAL_ASSESSMENT: ACTIVITIES ARE NOT PREVENTED

## 2024-03-08 ASSESSMENT — PAIN DESCRIPTION - LOCATION: LOCATION: KNEE

## 2024-03-08 ASSESSMENT — PAIN DESCRIPTION - PAIN TYPE: TYPE: ACUTE PAIN

## 2024-03-08 ASSESSMENT — PAIN DESCRIPTION - ONSET: ONSET: GRADUAL

## 2024-03-08 ASSESSMENT — PAIN DESCRIPTION - DESCRIPTORS: DESCRIPTORS: SORE

## 2024-03-08 ASSESSMENT — PAIN SCALES - GENERAL: PAINLEVEL_OUTOF10: 6

## 2024-03-08 ASSESSMENT — PAIN DESCRIPTION - ORIENTATION: ORIENTATION: RIGHT

## 2024-03-08 NOTE — ED PROVIDER NOTES
understanding and agreement with this plan of care. The patient was advised to return to the emergency department if symptoms should significantly worsen or if new and concerning symptoms should appear.     I estimate there is LOW risk for UNSTABLE FRACTURE, COMPARTMENT SYNDROME, DEEP VENOUS THROMBOSIS, SEPTIC ARTHRITIS, NEUROVASCULAR COMPROMISE, or TENDON/LIGAMENT RUPTURE, thus I consider the discharge disposition reasonable.    CRITICAL CARE TIME   0 minutes.    FINAL IMPRESSION      1. Arthritis of right knee    2. Calcification of knee ligament, right          DISPOSITION/PLAN   DISPOSITION Decision To Discharge 03/08/2024 07:19:11 PM      PATIENT REFERRED TO:  [facility]  [address]  [address]  [place] [facility] [address] [phone]  Schedule an appointment as soon as possible for a visit   for follow-up care      DISCHARGE MEDICATIONS:  New Prescriptions    NAPROXEN (NAPROSYN) 500 MG TABLET    Take 1 tablet by mouth 2 times daily as needed for Pain       DISCONTINUED MEDICATIONS:  Discontinued Medications    No medications on file            (Please note that portions of this note were completed with a voice recognition program.  Efforts were made to edit the dictations but occasionally words are mis-transcribed.)    NIDA Rinaldi (electronically signed)       Dom Dickerson PA  03/08/24 2022

## 2024-03-08 NOTE — ED TRIAGE NOTES
Right pain started about 2-3 weeks ago denies injury . Able to ambulate but reports increased pain

## 2024-11-18 ENCOUNTER — HOSPITAL ENCOUNTER (OUTPATIENT)
Dept: ULTRASOUND IMAGING | Age: 49
Discharge: HOME | End: 2024-11-18
Payer: COMMERCIAL

## 2024-11-18 DIAGNOSIS — R13.19 OTHER DYSPHAGIA: ICD-10-CM

## 2024-11-18 DIAGNOSIS — E04.1 NONTOXIC UNINODULAR GOITER: ICD-10-CM

## 2024-11-18 PROCEDURE — 76536 US EXAM OF HEAD AND NECK: CPT

## 2024-12-19 ENCOUNTER — OFFICE VISIT (OUTPATIENT)
Dept: ENT CLINIC | Age: 49
End: 2024-12-19
Payer: COMMERCIAL

## 2024-12-19 VITALS
BODY MASS INDEX: 50.02 KG/M2 | HEART RATE: 88 BPM | WEIGHT: 293 LBS | TEMPERATURE: 97.5 F | DIASTOLIC BLOOD PRESSURE: 71 MMHG | HEIGHT: 64 IN | SYSTOLIC BLOOD PRESSURE: 125 MMHG

## 2024-12-19 DIAGNOSIS — E04.2 MULTIPLE THYROID NODULES: Primary | ICD-10-CM

## 2024-12-19 DIAGNOSIS — E04.2 MULTIPLE THYROID NODULES: ICD-10-CM

## 2024-12-19 LAB — TSH SERPL DL<=0.005 MIU/L-ACNC: 3.15 UIU/ML (ref 0.27–4.2)

## 2024-12-19 PROCEDURE — 99204 OFFICE O/P NEW MOD 45 MIN: CPT | Performed by: OTOLARYNGOLOGY

## 2024-12-19 RX ORDER — FLUTICASONE PROPIONATE 50 MCG
1 SPRAY, SUSPENSION (ML) NASAL DAILY
COMMUNITY
Start: 2023-10-12

## 2024-12-19 RX ORDER — FLUTICASONE FUROATE AND VILANTEROL TRIFENATATE 100; 25 UG/1; UG/1
POWDER RESPIRATORY (INHALATION)
COMMUNITY
Start: 2024-11-25

## 2024-12-19 RX ORDER — QUETIAPINE FUMARATE 200 MG/1
TABLET, FILM COATED ORAL
COMMUNITY

## 2024-12-19 RX ORDER — QUETIAPINE FUMARATE 400 MG/1
TABLET, FILM COATED ORAL
COMMUNITY
Start: 2024-03-25

## 2024-12-19 RX ORDER — BUSPIRONE HYDROCHLORIDE 15 MG/1
15 TABLET ORAL 3 TIMES DAILY
COMMUNITY

## 2024-12-19 RX ORDER — GLUCOSAMINE HCL/CHONDROITIN SU 500-400 MG
CAPSULE ORAL
COMMUNITY
Start: 2023-10-29

## 2024-12-19 RX ORDER — LITHIUM CARBONATE 450 MG
TABLET, EXTENDED RELEASE ORAL 2 TIMES DAILY
COMMUNITY

## 2024-12-19 RX ORDER — TOPIRAMATE 100 MG/1
100 TABLET, FILM COATED ORAL DAILY
COMMUNITY
Start: 2024-12-05

## 2024-12-19 RX ORDER — GLUCOSAMINE HCL/CHONDROITIN SU 500-400 MG
CAPSULE ORAL
COMMUNITY
Start: 2023-10-17

## 2024-12-19 RX ORDER — PEN NEEDLE, DIABETIC 32GX 5/32"
NEEDLE, DISPOSABLE MISCELLANEOUS
COMMUNITY
Start: 2024-12-06

## 2024-12-19 RX ORDER — LISINOPRIL 5 MG/1
TABLET ORAL
COMMUNITY
Start: 2024-01-19

## 2024-12-19 RX ORDER — INSULIN LISPRO 100 [IU]/ML
INJECTION, SOLUTION INTRAVENOUS; SUBCUTANEOUS
COMMUNITY
Start: 2023-11-03

## 2024-12-19 RX ORDER — INSULIN DEGLUDEC 200 U/ML
INJECTION, SOLUTION SUBCUTANEOUS
COMMUNITY
Start: 2024-09-15

## 2024-12-19 ASSESSMENT — ENCOUNTER SYMPTOMS
VOICE CHANGE: 0
NAUSEA: 0
FACIAL SWELLING: 0
TROUBLE SWALLOWING: 0
CHOKING: 1
SORE THROAT: 0
EYE REDNESS: 0
RHINORRHEA: 0
DIARRHEA: 0
EYE ITCHING: 0
SINUS PRESSURE: 0
SINUS PAIN: 0
COUGH: 0
EYE PAIN: 0
SHORTNESS OF BREATH: 0

## 2024-12-19 NOTE — PROGRESS NOTES
Subjective:      Patient ID: Gita Villalpando is a 49 y.o. female.    HPI  Chief Complaint   Patient presents with    thyroid nodules       History of Present Illness  Head/Neck    Gita is a(n) 49 y.o. female who presents with thyroid nodules. Found while incarcerated for choking on food. Now with follow up nodules. Multiple with criteria for FNA. No family history of thyroid cancer or disease. No history of radiation exposure. Occasionally chokes on foods. Voice stable. No recent TSH.   Otalgia: No  Odynophagia: No  Dysphagia: Yes  Voice Changes: No  Lumps in neck: No  Modifying Factors: non smoker  Associates Signs and Symptoms: none    Patient Active Problem List   Diagnosis    Borderline personality disorder (HCC)    Accidental drug overdose    DM (diabetes mellitus) type II uncontrolled, periph vascular disorder    Mood disorder (HCC)    MDD (major depressive disorder), recurrent severe, without psychosis (HCC)    Toxic metabolic encephalopathy    Substance abuse (HCC)    Lithium toxicity    High anion gap metabolic acidosis    Elevated lactic acid level    Cocaine abuse (HCC)    PCP abuse (HCC)    Elevated LFTs    Leukocytosis    Sepsis (HCC)    Hyponatremia    Acute hypoxemic respiratory failure    Shock    Lithium overdose    Altered mental status    Lithium toxicity, accidental or unintentional, initial encounter    Hypotension    Schizoaffective disorder, bipolar type (HCC)    Elevated blood sugar     Past Surgical History:   Procedure Laterality Date    CHOLECYSTECTOMY      COLONOSCOPY      17 YRS AGO ,NORMAL    COLONOSCOPY N/A 11/16/2023    COLONOSCOPY performed by Gianfranco Anderson MD at [facility] ENDOSCOPY    CYSTOSCOPY      CYSTOSCOPY  5/816/14    CYSTOSCOPY, URETHRAL DILATATION      DILATION AND CURETTAGE OF UTERUS      ENDOMETRIAL ABLATION      ENDOSCOPY, COLON, DIAGNOSTIC      FRACTURE SURGERY      femur, left    KNEE SURGERY      TONSILLECTOMY      TUBAL LIGATION      UPPER GASTROINTESTINAL

## 2025-01-07 ENCOUNTER — HOSPITAL ENCOUNTER (OUTPATIENT)
Dept: ULTRASOUND IMAGING | Age: 50
Discharge: HOME | End: 2025-01-07
Attending: OTOLARYNGOLOGY
Payer: COMMERCIAL

## 2025-01-07 DIAGNOSIS — E04.2 MULTIPLE THYROID NODULES: ICD-10-CM

## 2025-01-07 PROCEDURE — 88172 CYTP DX EVAL FNA 1ST EA SITE: CPT

## 2025-01-07 PROCEDURE — 10005 FNA BX W/US GDN 1ST LES: CPT

## 2025-01-07 PROCEDURE — 88305 TISSUE EXAM BY PATHOLOGIST: CPT

## 2025-01-07 PROCEDURE — 88173 CYTOPATH EVAL FNA REPORT: CPT

## 2025-01-07 NOTE — PROCEDURES
Interventional Radiology Post Procedure    Date: 1/7/2025    Physician: Darnell Singh MD    Pre-op Diagnosis: thyroid nodules    Post-op Diagnosis: same    Variation from Planned Procedure: None       Findings: successful biopsy of the two dominant right sided thyroid nodules    Patient condition: Stable    Estimated Blood Loss: 1 cc    Specimens:  25G FNA x14      Signed,  Yuriy Singh MD  12:17 PM  1/7/2025

## 2025-01-13 DIAGNOSIS — E04.1 RIGHT THYROID NODULE: Primary | ICD-10-CM

## 2025-01-14 ENCOUNTER — PREP FOR PROCEDURE (OUTPATIENT)
Dept: ENT CLINIC | Age: 50
End: 2025-01-14

## 2025-01-14 ENCOUNTER — OFFICE VISIT (OUTPATIENT)
Dept: ENT CLINIC | Age: 50
End: 2025-01-14
Payer: COMMERCIAL

## 2025-01-14 VITALS
SYSTOLIC BLOOD PRESSURE: 126 MMHG | WEIGHT: 293 LBS | TEMPERATURE: 97.7 F | BODY MASS INDEX: 50.02 KG/M2 | HEART RATE: 93 BPM | HEIGHT: 64 IN | DIASTOLIC BLOOD PRESSURE: 82 MMHG

## 2025-01-14 DIAGNOSIS — E04.1 RIGHT THYROID NODULE: Primary | ICD-10-CM

## 2025-01-14 DIAGNOSIS — E04.1 RIGHT THYROID NODULE: ICD-10-CM

## 2025-01-14 PROCEDURE — 99214 OFFICE O/P EST MOD 30 MIN: CPT | Performed by: OTOLARYNGOLOGY

## 2025-01-14 ASSESSMENT — ENCOUNTER SYMPTOMS
CHOKING: 0
RHINORRHEA: 0
EYE ITCHING: 0
NAUSEA: 0
SINUS PRESSURE: 0
EYE REDNESS: 0
SINUS PAIN: 0
VOICE CHANGE: 0
SHORTNESS OF BREATH: 0
SORE THROAT: 0
COUGH: 0
FACIAL SWELLING: 0
DIARRHEA: 0
TROUBLE SWALLOWING: 0
EYE PAIN: 0

## 2025-01-14 NOTE — PROGRESS NOTES
Subjective:      Patient ID: Gita Villalpando is a 49 y.o. female.    HPI  Chief Complaint   Patient presents with    requesting surgery       History of Present Illness  Head/Neck    Gita is a(n) 49 y.o. female who presents with enlargening thyroid nodules. FNA benign on one and non diagnostic on second. Patient would like removed. Discussed risks and benefits. See below    Patient Active Problem List   Diagnosis    Borderline personality disorder (HCC)    Accidental drug overdose    DM (diabetes mellitus) type II uncontrolled, periph vascular disorder    Mood disorder (HCC)    MDD (major depressive disorder), recurrent severe, without psychosis (HCC)    Toxic metabolic encephalopathy    Substance abuse (HCC)    Lithium toxicity    High anion gap metabolic acidosis    Elevated lactic acid level    Cocaine abuse (HCC)    PCP abuse (HCC)    Elevated LFTs    Leukocytosis    Sepsis (HCC)    Hyponatremia    Acute hypoxemic respiratory failure    Shock    Lithium overdose    Altered mental status    Lithium toxicity, accidental or unintentional, initial encounter    Hypotension    Schizoaffective disorder, bipolar type (HCC)    Elevated blood sugar     Past Surgical History:   Procedure Laterality Date    CHOLECYSTECTOMY      COLONOSCOPY      17 YRS AGO ,NORMAL    COLONOSCOPY N/A 11/16/2023    COLONOSCOPY performed by Gianfranco Anderson MD at [facility] ENDOSCOPY    CYSTOSCOPY      CYSTOSCOPY  5/816/14    CYSTOSCOPY, URETHRAL DILATATION      DILATION AND CURETTAGE OF UTERUS      ENDOMETRIAL ABLATION      ENDOSCOPY, COLON, DIAGNOSTIC      FRACTURE SURGERY      femur, left    KNEE SURGERY      TONSILLECTOMY      TUBAL LIGATION      UPPER GASTROINTESTINAL ENDOSCOPY  2012    DONE X2 GERD    UPPER GASTROINTESTINAL ENDOSCOPY N/A 11/16/2023    ESOPHAGOGASTRODUODENOSCOPY performed by Gianfranco Anderson MD at [facility] ENDOSCOPY     Family History   Problem Relation Age of Onset    Diabetes Mother     High Blood Pressure Mother

## 2025-01-27 RX ORDER — SODIUM CHLORIDE 0.9 % (FLUSH) 0.9 %
5-40 SYRINGE (ML) INJECTION PRN
Status: CANCELLED | OUTPATIENT
Start: 2025-01-31

## 2025-01-27 RX ORDER — PROPRANOLOL HCL 20 MG
20 TABLET ORAL 2 TIMES DAILY
COMMUNITY
End: 2025-01-28

## 2025-01-27 RX ORDER — SODIUM CHLORIDE 0.9 % (FLUSH) 0.9 %
5-40 SYRINGE (ML) INJECTION EVERY 12 HOURS SCHEDULED
Status: CANCELLED | OUTPATIENT
Start: 2025-01-31

## 2025-01-27 RX ORDER — CLINDAMYCIN PHOSPHATE 600 MG/50ML
600 INJECTION, SOLUTION INTRAVENOUS ONCE
Status: CANCELLED | OUTPATIENT
Start: 2025-01-31 | End: 2025-01-27

## 2025-01-27 RX ORDER — SODIUM CHLORIDE 9 MG/ML
INJECTION, SOLUTION INTRAVENOUS PRN
Status: CANCELLED | OUTPATIENT
Start: 2025-01-31

## 2025-01-27 NOTE — PROGRESS NOTES
1/27/2025 1128 AM:     PRE-OP ORDERS/CONSENT TO BE ENTERED BY SURGEON'S OFFICE/TS.      CALLED PT AND \"MAILBOX FULL\" AND UNABLE TO LEAVE MESSAGE AND NEEDS H&P/TS    1/28/2025 0803 am:    CALLED PT AND MAILBOX FULL\"  AND UNABLE TO LEAVE MESSAGE/TS    NOTIFIED DR LUND'S OFFICE CALLED PT TWICE (2 DAYS)  AND UNABLE TO LEAVE MESSAGE D/T \"MAILBOX FULL\"/TS    1/28/2025 1115 AM:    PT RETURNED CALL AND TI COMPLETED/TS  H&P AND LABS IN MEDIA 1/28/2025 AND LABS FAXED TO DR LUND'S OFFICE INCLUDING HGBA1C FROM 11/27/202 6.6/TS

## 2025-01-28 RX ORDER — LISINOPRIL 10 MG/1
5 TABLET ORAL DAILY
Status: ON HOLD | COMMUNITY
End: 2025-01-31

## 2025-01-28 NOTE — PROGRESS NOTES
1/01381 1116 AM:        [facility] PRE-SURGICAL TESTING INSTRUCTIONS                      PRIOR TO PROCEDURE DATE:    1. PLEASE FOLLOW ANY INSTRUCTIONS GIVEN TO YOU PER YOUR SURGEON.      2. Arrange for someone to drive you home and be with you for the first 24 hours after discharge for your safety after your procedure for which you received sedation. Ensure it is someone we can share information with regarding your discharge.     NOTE: At this time ONLY 2 ADULTS may accompany you. NO CHILDREN UNDER AGE OF 16.    One person ENCOURAGED to stay at hospital entire time if outpatient surgery      3. You must contact your surgeon for instructions IF:  You are taking any blood thinners, aspirin, anti-inflammatory or vitamins.   Contact your ordering physician/surgeon for prescription medication instructions as soon as possible, especially if taking blood thinners, aspirin, heart, or diabetic medication.   There is a change in your physical condition such as a cold, fever, rash, cuts, sores, or any other infection, especially near your surgical site.    4. Do not drink alcohol the day before or day of your procedure.  Do not use any marijuana at least 24 hours or street drugs (heroin, cocaine) at minimum 5 days prior to your procedure.     5. A Pre-Surgical History and Physical MUST be completed WITHIN 30 DAYS OR LESS prior to your procedure.by your Physician or an Urgent Care        THE DAY OF YOUR PROCEDURE:  1.  Follow instructions for ARRIVAL TIME as DIRECTED BY YOUR SURGEON. [place] [phone]     2. Enter the MAIN entrance from [facility] and follow the signs to the free Parking Garage or  Parking (offered free of charge 7 am-5pm).      3. Enter the Main Entrance of the hospital (do not enter from the lower level of the parking garage). Upon entrance, check in with the  at the surgical information desk on your LEFT.   Bring your insurance card and  photo ID to register      4. DO NOT EAT ANYTHING 8 hours prior to arrival for surgery.  You may have up to 8 ounces of water 4 hours prior to your arrival for surgery.   NOTE: ALL Gastric, Bariatric & Bowel surgery patients - you MUST follow your surgeon's instructions regarding eating/ drinking as you will have very specific instructions to follow.  If you did not receive these, call your surgeon's office immediately.     5. MEDICATIONS:  The Day of procedure ONLY Take the following medications with a SMALL sip of water: MAY TAKE BUSPIRONE, WELLBUTRIN,  LITHIUM, SEROQUEL, PRILOSEC  Use your usual dose of inhalers the morning of surgery. BRING your rescue inhaler with you to hospital.   Anesthesia does NOT want you to take insulin diabetic medication the morning of surgery. They will control your blood sugar while you are at the hospital. Please contact your ordering physician for instructions regarding your insulin the night before your procedure. If you have an insulin pump, please keep it set on basal rate.   Per anesthesia HOLD/STOP OVER THE COUNTER SUPPLEMENTS/VITAMINS/NON-STEROIDAL ANTI-INFLAMMATORY MEDICATION 7 DAYS PRIOR TO PROCEDURE.  If taking any blood thinners, (including aspirin, eliquis, xarelto, plavix, coumadin) Contact doctor and follow instructions for when to hold/ prior to procedure.   Bariatric patient's call your surgeon if on diabetic medications as some may need to be stopped 1 week prior to surgery    6. Do not swallow additional water when brushing teeth. No gum, candy, mints, or ice chips. Refrain from smoking or at least decrease the amount on day of surgery.    7. Morning of surgery:   Take a shower with an antibacterial soap (i.e., Safeguard or Dial) OR your physician may have instructed you to use Hibiclens.  Dress in loose, comfortable clothing appropriate for redressing after your procedure.   Do not wear jewelry (including body piercings), make-up (especially NO eye make-up),

## 2025-01-30 ENCOUNTER — ANESTHESIA EVENT (OUTPATIENT)
Dept: OPERATING ROOM | Age: 50
End: 2025-01-30
Payer: COMMERCIAL

## 2025-01-30 ENCOUNTER — TELEPHONE (OUTPATIENT)
Dept: ENT CLINIC | Age: 50
End: 2025-01-30

## 2025-01-30 NOTE — TELEPHONE ENCOUNTER
Called and spoke with patient. Made aware of surgery date 01/31/2025 the arrival time is 08:45, for surgery time of 10:45 AM.

## 2025-01-31 ENCOUNTER — APPOINTMENT (OUTPATIENT)
Dept: GENERAL RADIOLOGY | Age: 50
DRG: 626 | End: 2025-01-31
Attending: OTOLARYNGOLOGY
Payer: MEDICARE

## 2025-01-31 ENCOUNTER — ANESTHESIA (OUTPATIENT)
Dept: OPERATING ROOM | Age: 50
End: 2025-01-31
Payer: COMMERCIAL

## 2025-01-31 ENCOUNTER — HOSPITAL ENCOUNTER (INPATIENT)
Age: 50
LOS: 1 days | Discharge: HOME | DRG: 626 | End: 2025-02-01
Attending: OTOLARYNGOLOGY | Admitting: INTERNAL MEDICINE
Payer: MEDICARE

## 2025-01-31 DIAGNOSIS — E04.1 RIGHT THYROID NODULE: ICD-10-CM

## 2025-01-31 PROBLEM — Z98.890 POSTOPERATIVE HYPOXIA: Status: ACTIVE | Noted: 2025-01-31

## 2025-01-31 PROBLEM — R09.02 POSTOPERATIVE HYPOXIA: Status: ACTIVE | Noted: 2025-01-31

## 2025-01-31 LAB
ALBUMIN SERPL-MCNC: 3.6 G/DL (ref 3.4–5)
ALBUMIN/GLOB SERPL: 1.2 {RATIO} (ref 1.1–2.2)
ALP SERPL-CCNC: 119 U/L (ref 40–129)
ALT SERPL-CCNC: 36 U/L (ref 10–40)
AMPHETAMINES UR QL SCN>1000 NG/ML: ABNORMAL
ANION GAP SERPL CALCULATED.3IONS-SCNC: 12 MMOL/L (ref 3–16)
AST SERPL-CCNC: 43 U/L (ref 15–37)
BARBITURATES UR QL SCN>200 NG/ML: ABNORMAL
BENZODIAZ UR QL SCN>200 NG/ML: POSITIVE
BILIRUB SERPL-MCNC: <0.2 MG/DL (ref 0–1)
BUN SERPL-MCNC: 12 MG/DL (ref 7–20)
CALCIUM SERPL-MCNC: 9.3 MG/DL (ref 8.3–10.6)
CANNABINOIDS UR QL SCN>50 NG/ML: ABNORMAL
CHLORIDE SERPL-SCNC: 104 MMOL/L (ref 99–110)
CO2 SERPL-SCNC: 19 MMOL/L (ref 21–32)
COCAINE UR QL SCN: ABNORMAL
CREAT SERPL-MCNC: 0.9 MG/DL (ref 0.6–1.1)
DRUG SCREEN COMMENT UR-IMP: ABNORMAL
FENTANYL SCREEN, URINE: POSITIVE
GFR SERPLBLD CREATININE-BSD FMLA CKD-EPI: 78 ML/MIN/{1.73_M2}
GLUCOSE BLD-MCNC: 116 MG/DL (ref 70–99)
GLUCOSE BLD-MCNC: 166 MG/DL (ref 70–99)
GLUCOSE BLD-MCNC: 260 MG/DL (ref 70–99)
GLUCOSE BLD-MCNC: 313 MG/DL (ref 70–99)
GLUCOSE SERPL-MCNC: 321 MG/DL (ref 70–99)
HCG UR QL: NEGATIVE
METHADONE UR QL SCN>300 NG/ML: ABNORMAL
OPIATES UR QL SCN>300 NG/ML: ABNORMAL
OXYCODONE UR QL SCN: ABNORMAL
PCP UR QL SCN>25 NG/ML: ABNORMAL
PERFORMED ON: ABNORMAL
PH UR STRIP: 7 [PH]
POTASSIUM SERPL-SCNC: 4.7 MMOL/L (ref 3.5–5.1)
PROT SERPL-MCNC: 6.6 G/DL (ref 6.4–8.2)
SODIUM SERPL-SCNC: 135 MMOL/L (ref 136–145)

## 2025-01-31 PROCEDURE — 36415 COLL VENOUS BLD VENIPUNCTURE: CPT

## 2025-01-31 PROCEDURE — 2720000010 HC SURG SUPPLY STERILE: Performed by: OTOLARYNGOLOGY

## 2025-01-31 PROCEDURE — 2580000003 HC RX 258: Performed by: ANESTHESIOLOGY

## 2025-01-31 PROCEDURE — 2580000003 HC RX 258

## 2025-01-31 PROCEDURE — 80307 DRUG TEST PRSMV CHEM ANLYZR: CPT

## 2025-01-31 PROCEDURE — 2500000003 HC RX 250 WO HCPCS: Performed by: INTERNAL MEDICINE

## 2025-01-31 PROCEDURE — 2700000000 HC OXYGEN THERAPY PER DAY

## 2025-01-31 PROCEDURE — 7100000000 HC PACU RECOVERY - FIRST 15 MIN: Performed by: OTOLARYNGOLOGY

## 2025-01-31 PROCEDURE — 3700000000 HC ANESTHESIA ATTENDED CARE: Performed by: OTOLARYNGOLOGY

## 2025-01-31 PROCEDURE — 84703 CHORIONIC GONADOTROPIN ASSAY: CPT

## 2025-01-31 PROCEDURE — 6370000000 HC RX 637 (ALT 250 FOR IP): Performed by: NURSE ANESTHETIST, CERTIFIED REGISTERED

## 2025-01-31 PROCEDURE — 80053 COMPREHEN METABOLIC PANEL: CPT

## 2025-01-31 PROCEDURE — 60220 PARTIAL REMOVAL OF THYROID: CPT | Performed by: OTOLARYNGOLOGY

## 2025-01-31 PROCEDURE — 71045 X-RAY EXAM CHEST 1 VIEW: CPT

## 2025-01-31 PROCEDURE — 1200000000 HC SEMI PRIVATE

## 2025-01-31 PROCEDURE — 6370000000 HC RX 637 (ALT 250 FOR IP): Performed by: INTERNAL MEDICINE

## 2025-01-31 PROCEDURE — 94761 N-INVAS EAR/PLS OXIMETRY MLT: CPT

## 2025-01-31 PROCEDURE — 94640 AIRWAY INHALATION TREATMENT: CPT

## 2025-01-31 PROCEDURE — 6360000002 HC RX W HCPCS: Performed by: NURSE ANESTHETIST, CERTIFIED REGISTERED

## 2025-01-31 PROCEDURE — 6360000002 HC RX W HCPCS: Performed by: STUDENT IN AN ORGANIZED HEALTH CARE EDUCATION/TRAINING PROGRAM

## 2025-01-31 PROCEDURE — 6360000002 HC RX W HCPCS: Performed by: INTERNAL MEDICINE

## 2025-01-31 PROCEDURE — 3700000001 HC ADD 15 MINUTES (ANESTHESIA): Performed by: OTOLARYNGOLOGY

## 2025-01-31 PROCEDURE — 88307 TISSUE EXAM BY PATHOLOGIST: CPT

## 2025-01-31 PROCEDURE — 3600000004 HC SURGERY LEVEL 4 BASE: Performed by: OTOLARYNGOLOGY

## 2025-01-31 PROCEDURE — 2500000003 HC RX 250 WO HCPCS: Performed by: OTOLARYNGOLOGY

## 2025-01-31 PROCEDURE — 6360000002 HC RX W HCPCS: Performed by: OTOLARYNGOLOGY

## 2025-01-31 PROCEDURE — 2580000003 HC RX 258: Performed by: NURSE ANESTHETIST, CERTIFIED REGISTERED

## 2025-01-31 PROCEDURE — 3600000014 HC SURGERY LEVEL 4 ADDTL 15MIN: Performed by: OTOLARYNGOLOGY

## 2025-01-31 PROCEDURE — 6360000002 HC RX W HCPCS: Performed by: ANESTHESIOLOGY

## 2025-01-31 PROCEDURE — 6370000000 HC RX 637 (ALT 250 FOR IP)

## 2025-01-31 PROCEDURE — 7100000001 HC PACU RECOVERY - ADDTL 15 MIN: Performed by: OTOLARYNGOLOGY

## 2025-01-31 PROCEDURE — 6370000000 HC RX 637 (ALT 250 FOR IP): Performed by: ANESTHESIOLOGY

## 2025-01-31 PROCEDURE — 0GTH0ZZ RESECTION OF RIGHT THYROID GLAND LOBE, OPEN APPROACH: ICD-10-PCS | Performed by: OTOLARYNGOLOGY

## 2025-01-31 PROCEDURE — 2709999900 HC NON-CHARGEABLE SUPPLY: Performed by: OTOLARYNGOLOGY

## 2025-01-31 RX ORDER — OXYCODONE HYDROCHLORIDE 5 MG/1
5 TABLET ORAL
Status: DISCONTINUED | OUTPATIENT
Start: 2025-01-31 | End: 2025-01-31 | Stop reason: HOSPADM

## 2025-01-31 RX ORDER — LABETALOL HYDROCHLORIDE 5 MG/ML
10 INJECTION, SOLUTION INTRAVENOUS
Status: DISCONTINUED | OUTPATIENT
Start: 2025-01-31 | End: 2025-01-31 | Stop reason: HOSPADM

## 2025-01-31 RX ORDER — SODIUM CHLORIDE 0.9 % (FLUSH) 0.9 %
5-40 SYRINGE (ML) INJECTION PRN
Status: DISCONTINUED | OUTPATIENT
Start: 2025-01-31 | End: 2025-01-31 | Stop reason: HOSPADM

## 2025-01-31 RX ORDER — SODIUM CHLORIDE 9 MG/ML
INJECTION, SOLUTION INTRAVENOUS PRN
Status: DISCONTINUED | OUTPATIENT
Start: 2025-01-31 | End: 2025-01-31 | Stop reason: HOSPADM

## 2025-01-31 RX ORDER — DEXAMETHASONE SODIUM PHOSPHATE 4 MG/ML
INJECTION, SOLUTION INTRA-ARTICULAR; INTRALESIONAL; INTRAMUSCULAR; INTRAVENOUS; SOFT TISSUE
Status: DISCONTINUED | OUTPATIENT
Start: 2025-01-31 | End: 2025-01-31 | Stop reason: SDUPTHER

## 2025-01-31 RX ORDER — QUETIAPINE FUMARATE 200 MG/1
600 TABLET, FILM COATED ORAL NIGHTLY
Status: DISCONTINUED | OUTPATIENT
Start: 2025-02-01 | End: 2025-02-01 | Stop reason: HOSPADM

## 2025-01-31 RX ORDER — POTASSIUM CHLORIDE 1500 MG/1
40 TABLET, EXTENDED RELEASE ORAL PRN
Status: DISCONTINUED | OUTPATIENT
Start: 2025-01-31 | End: 2025-02-01 | Stop reason: HOSPADM

## 2025-01-31 RX ORDER — IPRATROPIUM BROMIDE AND ALBUTEROL SULFATE 2.5; .5 MG/3ML; MG/3ML
1 SOLUTION RESPIRATORY (INHALATION)
Status: COMPLETED | OUTPATIENT
Start: 2025-01-31 | End: 2025-01-31

## 2025-01-31 RX ORDER — ACETAMINOPHEN 325 MG/1
650 TABLET ORAL EVERY 6 HOURS PRN
Status: DISCONTINUED | OUTPATIENT
Start: 2025-01-31 | End: 2025-02-01 | Stop reason: HOSPADM

## 2025-01-31 RX ORDER — QUETIAPINE FUMARATE 200 MG/1
200 TABLET, FILM COATED ORAL ONCE
Status: COMPLETED | OUTPATIENT
Start: 2025-01-31 | End: 2025-01-31

## 2025-01-31 RX ORDER — DIPHENHYDRAMINE HYDROCHLORIDE 50 MG/ML
12.5 INJECTION INTRAMUSCULAR; INTRAVENOUS
Status: DISCONTINUED | OUTPATIENT
Start: 2025-01-31 | End: 2025-01-31 | Stop reason: HOSPADM

## 2025-01-31 RX ORDER — DEXTROSE MONOHYDRATE 100 MG/ML
INJECTION, SOLUTION INTRAVENOUS CONTINUOUS PRN
Status: DISCONTINUED | OUTPATIENT
Start: 2025-01-31 | End: 2025-02-01 | Stop reason: HOSPADM

## 2025-01-31 RX ORDER — ONDANSETRON 2 MG/ML
4 INJECTION INTRAMUSCULAR; INTRAVENOUS EVERY 6 HOURS PRN
Status: DISCONTINUED | OUTPATIENT
Start: 2025-01-31 | End: 2025-02-01 | Stop reason: HOSPADM

## 2025-01-31 RX ORDER — POLYETHYLENE GLYCOL 3350 17 G/17G
17 POWDER, FOR SOLUTION ORAL DAILY PRN
Status: DISCONTINUED | OUTPATIENT
Start: 2025-01-31 | End: 2025-02-01 | Stop reason: HOSPADM

## 2025-01-31 RX ORDER — TOPIRAMATE 100 MG/1
200 TABLET, FILM COATED ORAL NIGHTLY
Status: DISCONTINUED | OUTPATIENT
Start: 2025-02-01 | End: 2025-02-01 | Stop reason: HOSPADM

## 2025-01-31 RX ORDER — BUPROPION HYDROCHLORIDE 150 MG/1
450 TABLET ORAL EVERY MORNING
Status: DISCONTINUED | OUTPATIENT
Start: 2025-02-01 | End: 2025-02-01 | Stop reason: HOSPADM

## 2025-01-31 RX ORDER — GLUCAGON 1 MG/ML
1 KIT INJECTION PRN
Status: DISCONTINUED | OUTPATIENT
Start: 2025-01-31 | End: 2025-02-01 | Stop reason: HOSPADM

## 2025-01-31 RX ORDER — TOPIRAMATE 100 MG/1
100 TABLET, FILM COATED ORAL ONCE
Status: COMPLETED | OUTPATIENT
Start: 2025-02-01 | End: 2025-01-31

## 2025-01-31 RX ORDER — SODIUM CHLORIDE, SODIUM LACTATE, POTASSIUM CHLORIDE, CALCIUM CHLORIDE 600; 310; 30; 20 MG/100ML; MG/100ML; MG/100ML; MG/100ML
INJECTION, SOLUTION INTRAVENOUS CONTINUOUS
Status: DISCONTINUED | OUTPATIENT
Start: 2025-01-31 | End: 2025-01-31 | Stop reason: HOSPADM

## 2025-01-31 RX ORDER — QUETIAPINE FUMARATE 200 MG/1
200 TABLET, FILM COATED ORAL DAILY
Status: DISCONTINUED | OUTPATIENT
Start: 2025-02-01 | End: 2025-02-01 | Stop reason: HOSPADM

## 2025-01-31 RX ORDER — SUCCINYLCHOLINE CHLORIDE 20 MG/ML
INJECTION INTRAMUSCULAR; INTRAVENOUS
Status: DISCONTINUED | OUTPATIENT
Start: 2025-01-31 | End: 2025-01-31 | Stop reason: SDUPTHER

## 2025-01-31 RX ORDER — ENOXAPARIN SODIUM 100 MG/ML
30 INJECTION SUBCUTANEOUS 2 TIMES DAILY
Status: DISCONTINUED | OUTPATIENT
Start: 2025-01-31 | End: 2025-02-01 | Stop reason: HOSPADM

## 2025-01-31 RX ORDER — SODIUM CHLORIDE 9 MG/ML
INJECTION, SOLUTION INTRAVENOUS PRN
Status: DISCONTINUED | OUTPATIENT
Start: 2025-01-31 | End: 2025-02-01 | Stop reason: HOSPADM

## 2025-01-31 RX ORDER — HYDROMORPHONE HYDROCHLORIDE 1 MG/ML
0.5 INJECTION, SOLUTION INTRAMUSCULAR; INTRAVENOUS; SUBCUTANEOUS EVERY 5 MIN PRN
Status: DISCONTINUED | OUTPATIENT
Start: 2025-01-31 | End: 2025-01-31 | Stop reason: HOSPADM

## 2025-01-31 RX ORDER — HYDRALAZINE HYDROCHLORIDE 20 MG/ML
10 INJECTION INTRAMUSCULAR; INTRAVENOUS EVERY 6 HOURS PRN
Status: DISCONTINUED | OUTPATIENT
Start: 2025-01-31 | End: 2025-02-01 | Stop reason: HOSPADM

## 2025-01-31 RX ORDER — CLINDAMYCIN PHOSPHATE 600 MG/50ML
600 INJECTION, SOLUTION INTRAVENOUS ONCE
Status: COMPLETED | OUTPATIENT
Start: 2025-01-31 | End: 2025-01-31

## 2025-01-31 RX ORDER — MIDAZOLAM HYDROCHLORIDE 1 MG/ML
INJECTION, SOLUTION INTRAMUSCULAR; INTRAVENOUS
Status: DISCONTINUED | OUTPATIENT
Start: 2025-01-31 | End: 2025-01-31 | Stop reason: SDUPTHER

## 2025-01-31 RX ORDER — TOPIRAMATE 100 MG/1
100 TABLET, FILM COATED ORAL DAILY
Status: DISCONTINUED | OUTPATIENT
Start: 2025-01-31 | End: 2025-01-31

## 2025-01-31 RX ORDER — SODIUM CHLORIDE 0.9 % (FLUSH) 0.9 %
5-40 SYRINGE (ML) INJECTION EVERY 12 HOURS SCHEDULED
Status: DISCONTINUED | OUTPATIENT
Start: 2025-01-31 | End: 2025-02-01 | Stop reason: HOSPADM

## 2025-01-31 RX ORDER — LITHIUM CARBONATE 450 MG
450 TABLET, EXTENDED RELEASE ORAL EVERY 12 HOURS SCHEDULED
Status: DISCONTINUED | OUTPATIENT
Start: 2025-01-31 | End: 2025-02-01 | Stop reason: HOSPADM

## 2025-01-31 RX ORDER — ALBUTEROL SULFATE 0.83 MG/ML
2.5 SOLUTION RESPIRATORY (INHALATION) EVERY 6 HOURS PRN
Status: DISCONTINUED | OUTPATIENT
Start: 2025-01-31 | End: 2025-02-01 | Stop reason: HOSPADM

## 2025-01-31 RX ORDER — INSULIN LISPRO 100 [IU]/ML
0-4 INJECTION, SOLUTION INTRAVENOUS; SUBCUTANEOUS
Status: DISCONTINUED | OUTPATIENT
Start: 2025-01-31 | End: 2025-02-01 | Stop reason: HOSPADM

## 2025-01-31 RX ORDER — MAGNESIUM SULFATE IN WATER 40 MG/ML
2000 INJECTION, SOLUTION INTRAVENOUS PRN
Status: DISCONTINUED | OUTPATIENT
Start: 2025-01-31 | End: 2025-02-01 | Stop reason: HOSPADM

## 2025-01-31 RX ORDER — LIDOCAINE HYDROCHLORIDE 20 MG/ML
INJECTION, SOLUTION EPIDURAL; INFILTRATION; INTRACAUDAL; PERINEURAL
Status: DISCONTINUED | OUTPATIENT
Start: 2025-01-31 | End: 2025-01-31 | Stop reason: SDUPTHER

## 2025-01-31 RX ORDER — HYDROMORPHONE HYDROCHLORIDE 1 MG/ML
0.25 INJECTION, SOLUTION INTRAMUSCULAR; INTRAVENOUS; SUBCUTANEOUS
Status: DISCONTINUED | OUTPATIENT
Start: 2025-01-31 | End: 2025-02-01

## 2025-01-31 RX ORDER — OXYCODONE HYDROCHLORIDE 5 MG/1
5 TABLET ORAL EVERY 6 HOURS PRN
Qty: 10 TABLET | Refills: 0 | Status: SHIPPED | OUTPATIENT
Start: 2025-01-31 | End: 2025-02-06

## 2025-01-31 RX ORDER — ACETAMINOPHEN 650 MG/1
650 SUPPOSITORY RECTAL EVERY 6 HOURS PRN
Status: DISCONTINUED | OUTPATIENT
Start: 2025-01-31 | End: 2025-02-01 | Stop reason: HOSPADM

## 2025-01-31 RX ORDER — HYDROMORPHONE HYDROCHLORIDE 2 MG/ML
INJECTION, SOLUTION INTRAMUSCULAR; INTRAVENOUS; SUBCUTANEOUS
Status: DISCONTINUED | OUTPATIENT
Start: 2025-01-31 | End: 2025-01-31 | Stop reason: SDUPTHER

## 2025-01-31 RX ORDER — LORAZEPAM 2 MG/ML
0.5 INJECTION INTRAMUSCULAR
Status: DISCONTINUED | OUTPATIENT
Start: 2025-01-31 | End: 2025-01-31 | Stop reason: HOSPADM

## 2025-01-31 RX ORDER — ALBUTEROL SULFATE 90 UG/1
INHALANT RESPIRATORY (INHALATION)
Status: DISCONTINUED | OUTPATIENT
Start: 2025-01-31 | End: 2025-01-31 | Stop reason: SDUPTHER

## 2025-01-31 RX ORDER — MEPERIDINE HYDROCHLORIDE 25 MG/ML
12.5 INJECTION INTRAMUSCULAR; INTRAVENOUS; SUBCUTANEOUS EVERY 5 MIN PRN
Status: DISCONTINUED | OUTPATIENT
Start: 2025-01-31 | End: 2025-01-31 | Stop reason: HOSPADM

## 2025-01-31 RX ORDER — FENTANYL CITRATE 50 UG/ML
INJECTION, SOLUTION INTRAMUSCULAR; INTRAVENOUS
Status: DISCONTINUED | OUTPATIENT
Start: 2025-01-31 | End: 2025-01-31 | Stop reason: SDUPTHER

## 2025-01-31 RX ORDER — ONDANSETRON 2 MG/ML
4 INJECTION INTRAMUSCULAR; INTRAVENOUS
Status: COMPLETED | OUTPATIENT
Start: 2025-01-31 | End: 2025-01-31

## 2025-01-31 RX ORDER — ONDANSETRON 4 MG/1
4 TABLET, ORALLY DISINTEGRATING ORAL EVERY 8 HOURS PRN
Status: DISCONTINUED | OUTPATIENT
Start: 2025-01-31 | End: 2025-02-01 | Stop reason: HOSPADM

## 2025-01-31 RX ORDER — NALOXONE HYDROCHLORIDE 0.4 MG/ML
INJECTION, SOLUTION INTRAMUSCULAR; INTRAVENOUS; SUBCUTANEOUS PRN
Status: DISCONTINUED | OUTPATIENT
Start: 2025-01-31 | End: 2025-01-31 | Stop reason: HOSPADM

## 2025-01-31 RX ORDER — PROPOFOL 10 MG/ML
INJECTION, EMULSION INTRAVENOUS
Status: DISCONTINUED | OUTPATIENT
Start: 2025-01-31 | End: 2025-01-31 | Stop reason: SDUPTHER

## 2025-01-31 RX ORDER — INSULIN GLARGINE 100 [IU]/ML
10 INJECTION, SOLUTION SUBCUTANEOUS DAILY
Status: DISCONTINUED | OUTPATIENT
Start: 2025-01-31 | End: 2025-02-01 | Stop reason: HOSPADM

## 2025-01-31 RX ORDER — SODIUM CHLORIDE, SODIUM LACTATE, POTASSIUM CHLORIDE, CALCIUM CHLORIDE 600; 310; 30; 20 MG/100ML; MG/100ML; MG/100ML; MG/100ML
INJECTION, SOLUTION INTRAVENOUS
Status: DISCONTINUED | OUTPATIENT
Start: 2025-01-31 | End: 2025-01-31 | Stop reason: SDUPTHER

## 2025-01-31 RX ORDER — SODIUM CHLORIDE 0.9 % (FLUSH) 0.9 %
5-40 SYRINGE (ML) INJECTION PRN
Status: DISCONTINUED | OUTPATIENT
Start: 2025-01-31 | End: 2025-02-01 | Stop reason: HOSPADM

## 2025-01-31 RX ORDER — QUETIAPINE FUMARATE 200 MG/1
400 TABLET, FILM COATED ORAL NIGHTLY
Status: DISCONTINUED | OUTPATIENT
Start: 2025-01-31 | End: 2025-01-31

## 2025-01-31 RX ORDER — IPRATROPIUM BROMIDE AND ALBUTEROL SULFATE 2.5; .5 MG/3ML; MG/3ML
1 SOLUTION RESPIRATORY (INHALATION)
Status: DISCONTINUED | OUTPATIENT
Start: 2025-01-31 | End: 2025-02-01

## 2025-01-31 RX ORDER — SODIUM CHLORIDE 0.9 % (FLUSH) 0.9 %
5-40 SYRINGE (ML) INJECTION EVERY 12 HOURS SCHEDULED
Status: DISCONTINUED | OUTPATIENT
Start: 2025-01-31 | End: 2025-01-31 | Stop reason: HOSPADM

## 2025-01-31 RX ORDER — FENTANYL CITRATE 50 UG/ML
25 INJECTION, SOLUTION INTRAMUSCULAR; INTRAVENOUS EVERY 5 MIN PRN
Status: DISCONTINUED | OUTPATIENT
Start: 2025-01-31 | End: 2025-01-31 | Stop reason: HOSPADM

## 2025-01-31 RX ORDER — LIDOCAINE HYDROCHLORIDE AND EPINEPHRINE 10; 10 MG/ML; UG/ML
INJECTION, SOLUTION INFILTRATION; PERINEURAL PRN
Status: DISCONTINUED | OUTPATIENT
Start: 2025-01-31 | End: 2025-01-31 | Stop reason: HOSPADM

## 2025-01-31 RX ORDER — POTASSIUM CHLORIDE 7.45 MG/ML
10 INJECTION INTRAVENOUS PRN
Status: DISCONTINUED | OUTPATIENT
Start: 2025-01-31 | End: 2025-02-01 | Stop reason: HOSPADM

## 2025-01-31 RX ORDER — HYDROMORPHONE HYDROCHLORIDE 1 MG/ML
0.5 INJECTION, SOLUTION INTRAMUSCULAR; INTRAVENOUS; SUBCUTANEOUS
Status: DISCONTINUED | OUTPATIENT
Start: 2025-01-31 | End: 2025-02-01

## 2025-01-31 RX ADMIN — FENTANYL CITRATE 100 MCG: 50 INJECTION, SOLUTION INTRAMUSCULAR; INTRAVENOUS at 10:53

## 2025-01-31 RX ADMIN — DEXAMETHASONE SODIUM PHOSPHATE 8 MG: 4 INJECTION INTRA-ARTICULAR; INTRALESIONAL; INTRAMUSCULAR; INTRAVENOUS; SOFT TISSUE at 10:26

## 2025-01-31 RX ADMIN — INSULIN LISPRO 2 UNITS: 100 INJECTION, SOLUTION INTRAVENOUS; SUBCUTANEOUS at 18:44

## 2025-01-31 RX ADMIN — HYDROMORPHONE HYDROCHLORIDE 0.5 MG: 1 INJECTION, SOLUTION INTRAMUSCULAR; INTRAVENOUS; SUBCUTANEOUS at 20:22

## 2025-01-31 RX ADMIN — SUCCINYLCHOLINE CHLORIDE 180 MG: 20 INJECTION, SOLUTION INTRAMUSCULAR; INTRAVENOUS at 10:21

## 2025-01-31 RX ADMIN — PHENYLEPHRINE HYDROCHLORIDE 20 MCG/MIN: 10 INJECTION, SOLUTION INTRAVENOUS at 10:26

## 2025-01-31 RX ADMIN — ALBUTEROL SULFATE 8 PUFF: 90 AEROSOL, METERED RESPIRATORY (INHALATION) at 10:47

## 2025-01-31 RX ADMIN — PROPOFOL 50 MG: 10 INJECTION, EMULSION INTRAVENOUS at 10:29

## 2025-01-31 RX ADMIN — PHENYLEPHRINE HYDROCHLORIDE 200 MCG: 10 INJECTION, SOLUTION INTRAVENOUS at 10:40

## 2025-01-31 RX ADMIN — INSULIN GLARGINE 10 UNITS: 100 INJECTION, SOLUTION SUBCUTANEOUS at 18:44

## 2025-01-31 RX ADMIN — CLINDAMYCIN PHOSPHATE 900 MG: 600 INJECTION, SOLUTION INTRAVENOUS at 10:26

## 2025-01-31 RX ADMIN — ONDANSETRON 4 MG: 2 INJECTION, SOLUTION INTRAMUSCULAR; INTRAVENOUS at 12:54

## 2025-01-31 RX ADMIN — QUETIAPINE FUMARATE 400 MG: 200 TABLET ORAL at 21:18

## 2025-01-31 RX ADMIN — TOPIRAMATE 100 MG: 100 TABLET, FILM COATED ORAL at 20:32

## 2025-01-31 RX ADMIN — MIDAZOLAM HYDROCHLORIDE 2 MG: 1 INJECTION, SOLUTION INTRAMUSCULAR; INTRAVENOUS at 10:15

## 2025-01-31 RX ADMIN — TOPIRAMATE 100 MG: 100 TABLET, FILM COATED ORAL at 23:49

## 2025-01-31 RX ADMIN — PROPOFOL 250 MG: 10 INJECTION, EMULSION INTRAVENOUS at 10:21

## 2025-01-31 RX ADMIN — SODIUM CHLORIDE, SODIUM LACTATE, POTASSIUM CHLORIDE, AND CALCIUM CHLORIDE: .6; .31; .03; .02 INJECTION, SOLUTION INTRAVENOUS at 10:15

## 2025-01-31 RX ADMIN — IPRATROPIUM BROMIDE AND ALBUTEROL SULFATE 1 DOSE: 2.5; .5 SOLUTION RESPIRATORY (INHALATION) at 12:48

## 2025-01-31 RX ADMIN — HYDROMORPHONE HYDROCHLORIDE 0.25 MG: 1 INJECTION, SOLUTION INTRAMUSCULAR; INTRAVENOUS; SUBCUTANEOUS at 23:53

## 2025-01-31 RX ADMIN — PROPOFOL 50 MCG/KG/MIN: 10 INJECTION, EMULSION INTRAVENOUS at 10:26

## 2025-01-31 RX ADMIN — HYDROMORPHONE HYDROCHLORIDE 2 MG: 2 INJECTION, SOLUTION INTRAMUSCULAR; INTRAVENOUS; SUBCUTANEOUS at 10:21

## 2025-01-31 RX ADMIN — LITHIUM CARBONATE 450 MG: 450 TABLET, EXTENDED RELEASE ORAL at 20:53

## 2025-01-31 RX ADMIN — ENOXAPARIN SODIUM 30 MG: 100 INJECTION SUBCUTANEOUS at 23:52

## 2025-01-31 RX ADMIN — LIDOCAINE HYDROCHLORIDE 100 MG: 20 INJECTION, SOLUTION EPIDURAL; INFILTRATION; INTRACAUDAL; PERINEURAL at 10:21

## 2025-01-31 RX ADMIN — PHENYLEPHRINE HYDROCHLORIDE 200 MCG: 10 INJECTION, SOLUTION INTRAVENOUS at 10:38

## 2025-01-31 RX ADMIN — SODIUM CHLORIDE, POTASSIUM CHLORIDE, SODIUM LACTATE AND CALCIUM CHLORIDE: 600; 310; 30; 20 INJECTION, SOLUTION INTRAVENOUS at 09:47

## 2025-01-31 RX ADMIN — QUETIAPINE FUMARATE 200 MG: 200 TABLET ORAL at 23:51

## 2025-01-31 RX ADMIN — IPRATROPIUM BROMIDE AND ALBUTEROL SULFATE 1 DOSE: 2.5; .5 SOLUTION RESPIRATORY (INHALATION) at 19:57

## 2025-01-31 RX ADMIN — BUSPIRONE HYDROCHLORIDE 15 MG: 10 TABLET ORAL at 20:54

## 2025-01-31 RX ADMIN — SODIUM CHLORIDE, PRESERVATIVE FREE 10 ML: 5 INJECTION INTRAVENOUS at 20:31

## 2025-01-31 RX ADMIN — INSULIN LISPRO 3 UNITS: 100 INJECTION, SOLUTION INTRAVENOUS; SUBCUTANEOUS at 20:53

## 2025-01-31 ASSESSMENT — PAIN DESCRIPTION - ONSET
ONSET: ON-GOING

## 2025-01-31 ASSESSMENT — PAIN SCALES - GENERAL
PAINLEVEL_OUTOF10: 3
PAINLEVEL_OUTOF10: 6
PAINLEVEL_OUTOF10: 3
PAINLEVEL_OUTOF10: 0
PAINLEVEL_OUTOF10: 8
PAINLEVEL_OUTOF10: 3

## 2025-01-31 ASSESSMENT — PAIN - FUNCTIONAL ASSESSMENT
PAIN_FUNCTIONAL_ASSESSMENT: ACTIVITIES ARE NOT PREVENTED
PAIN_FUNCTIONAL_ASSESSMENT: ACTIVITIES ARE NOT PREVENTED
PAIN_FUNCTIONAL_ASSESSMENT: PREVENTS OR INTERFERES WITH ALL ACTIVE AND SOME PASSIVE ACTIVITIES
PAIN_FUNCTIONAL_ASSESSMENT: 0-10
PAIN_FUNCTIONAL_ASSESSMENT: PREVENTS OR INTERFERES SOME ACTIVE ACTIVITIES AND ADLS

## 2025-01-31 ASSESSMENT — PAIN DESCRIPTION - LOCATION
LOCATION: NECK

## 2025-01-31 ASSESSMENT — PAIN DESCRIPTION - DESCRIPTORS
DESCRIPTORS: SHARP

## 2025-01-31 ASSESSMENT — PAIN DESCRIPTION - FREQUENCY
FREQUENCY: CONTINUOUS

## 2025-01-31 ASSESSMENT — PAIN DESCRIPTION - DIRECTION: RADIATING_TOWARDS: NS

## 2025-01-31 ASSESSMENT — PAIN DESCRIPTION - ORIENTATION
ORIENTATION: ANTERIOR

## 2025-01-31 ASSESSMENT — PAIN DESCRIPTION - PAIN TYPE
TYPE: SURGICAL PAIN

## 2025-01-31 NOTE — H&P
History and Physical    Admit Date: 1/31/2025    Patient's PCP: Denise Dover, APRN - NP      Chief complaints: Postop hypoxia      HISTORY OF PRESENT ILLNESS:    This is a very pleasant 49 y.o. female with a history of severe borderline personality disorder, bipolar disorder, DM2, HLD, morbid obesity and enlargening thyroid nodules, who was admitted for elective hemithyroidectomy, noted with postop hypoxia.    She has been followed in the outpatient setting, with ENT for thyroid nodules, FNA was benign on one and non diagnostic on second, and appears elected for surgical management, and presented to same-day surgery for this.     She states she was doing well, with no breathing issue or respiratory complaints, no chest pain dizziness or lightheadedness, until she had right hemithyroidectomy with continuous Laryngeal Nerve Integrity Monitoring by Dr. Hodge under general anesthesia with EBL less than 25 mL .    In the postop setting, she was noted with increased O2 requirement, 5L NC, and  tachycardia despite IS and breathing treatment. She was drowsy but easily aroused, oriented x 3, and following commands. She had about 30 minutes with IS breathing exercises, deep breathing and coughing and sat improved to 95% following good pulmonary toilet. Xray showed no acute disease.     On attempt to discharge she was noted to desat to mid/high 80's and placed again on oxygen via nasal cannula. Patient did not feel comfortable going home especially as she lives alone.    She has asthma, previously followed by Dr. Vela; home medications include Breo.  She has as needed albuterol but says she frequently has really not needed it.  She has obstructive sleep apnea, noncompliant with CPAP.    She lives in a Group Home, and has a significant past psychiatric history, with history of severe borderline personality disorder, bipolar disorder, and history of polysubstance abuse ( cocaine, PCP and methadone. ), with

## 2025-01-31 NOTE — PROGRESS NOTES
1230: oral airway removed. Pt still requiring NRB flushed. She is tachypenic. Bilat breath sounds diminished. RT paged for breathing tx.

## 2025-01-31 NOTE — PROGRESS NOTES
Unable to wean pt down from 5L NC. She is currently sitting at 90-91% on 5L. Wet sounding, strong, non-productive cough. Her friend, Maris, is her ride home and she has to leave at 1600 for a prior commitment.     Writer voiced concern that it is unlikely patient will be ready for d/c by 1600 or that she will even be able to go home today d/t her oxygen requirement. Friend stated she will not be back by tomorrow to take patient back home to her group home.    Explained that the hospital has previously offered a \"aleida\" uber transportation when one does not have adequate transportation home after being admitted and this could be an option for patient if she were to need a ride home tomorrow. Friend and pt BEBETO.

## 2025-01-31 NOTE — ANESTHESIA POSTPROCEDURE EVALUATION
Department of Anesthesiology  Postprocedure Note    Patient: Gita Villalpando  MRN: [id number]  YOB: 1975  Date of evaluation: 1/31/2025    Procedure Summary       Date: 01/31/25 Room / Location: [address]    Anesthesia Start: 1015 Anesthesia Stop: 1203    Procedure: HEMITHYROIDECTOMY; RIGHT (Right: Neck) Diagnosis:       Right thyroid nodule      (Right thyroid nodule [E04.1])    Surgeons: Brent Hodge MD Responsible Provider: Niraj Holden MD    Anesthesia Type: General ASA Status: 4            Anesthesia Type: General    Antonio Phase I: Antonio Score: 4    Antonio Phase II:      Anesthesia Post Evaluation    Patient location during evaluation: PACU  Patient participation: complete - patient participated  Level of consciousness: awake  Airway patency: patent  Nausea & Vomiting: no nausea and no vomiting  Cardiovascular status: blood pressure returned to baseline and hemodynamically stable  Respiratory status: acceptable  Hydration status: euvolemic  Multimodal analgesia pain management approach  Pain management: adequate    No notable events documented.

## 2025-01-31 NOTE — H&P
Update History & Physical    The patient's History and Physical  was reviewed with the patient and I examined the patient. There was no change. The surgical site was confirmed by the patient and me.       Plan: The risks, benefits, expected outcome, and alternative to the recommended procedure have been discussed with the patient. Patient understands and wants to proceed with the procedure.     Electronically signed by Edith Becker MD on 1/31/2025 at 9:45 AM

## 2025-01-31 NOTE — FLOWSHEET NOTE
Pt to [facility] room7. O2 sat 84% on room air after doing IS multiple times.  Placed O2 @ 4L to maintain O2 at 88-90%.  Notified PACU nurse. PACU nurse to notify NP to evaluate patient.     1505 Pt sitting up in bed. Pt tachypneic at 28 and shallow. Eating jello. O2 off at this time. O2 sat 87-88% P110.     1520 O2 sat decreased to 85-86%. Placed on 3L O2/NC.     1545 - Called Dr. Hodge. Updated on condition. Pt will be admitted to in patient per Dr. Hodge.      1550 Joan NP and Dr. Becker at bedside.     1651 - Report given to receiving nurse on [address].

## 2025-01-31 NOTE — PROGRESS NOTES
4 Eyes Skin Assessment     NAME:  Gita Villalpando  YOB: 1975  MEDICAL RECORD NUMBER:  [id number]    The patient is being assessed for  Admission    I agree that at least one RN has performed a thorough Head to Toe Skin Assessment on the patient. ALL assessment sites listed below have been assessed.      Areas assessed by both nurses:    Head, Face, Ears, Shoulders, Back, Chest, Arms, Elbows, Hands, Sacrum. Buttock, Coccyx, Ischium, Legs. Feet and Heels, and Under Medical Devices         Does the Patient have a Wound? No noted wound(s)       Carlos Prevention initiated by RN: No  Wound Care Orders initiated by RN: No    Pressure Injury (Stage 3,4, Unstageable, DTI, NWPT, and Complex wounds) if present, place Wound referral order by RN under : No    New Ostomies, if present place, Ostomy referral order under : No     Nurse 1 eSignature: Electronically signed by Neftali Pagan RN on 1/31/25 at 5:24 PM EST    **SHARE this note so that the co-signing nurse can place an eSignature**    Nurse 2 eSignature: Electronically signed by Edward Urbano RN on 1/31/25 at 7:31 PM EST

## 2025-01-31 NOTE — PROGRESS NOTES
Joan NP with surg team worked with pt and was able to have pt maintain 89-92% on RA, even when asleep.   Friend, Marsi, at bedside and stated she is ok to take patient home.   Chest XRAY was discussed with pt and Maris.  Pt tolerating Pepsi and jell-o.

## 2025-01-31 NOTE — PROGRESS NOTES
Notified by PACU RN that patient is with increased O2 demand, 5L nc, and is tachycardic despite IS and breathing treatment. Examined patient in PACU following stat chest xray. Patient drowsy but easily aroused, oriented x 3, and following commands. Worked extensively for 30 minutes with IS breathing exercises, deep breathing and coughing.Patient's sat improves to 95% following good pulmonary toilet. Xray showed no acute disease, and the decision to continue with her discharge seemed prudent. The patient was moved to same day in preparation for discharge.    We received a follow-up message from RN in Same Day stating she again has desat to mid/high 80's prompting a return to supplemental O2. Upon examination, the patient stated she did not feel comfortable going home with no one there to watch her which is prudent considering the increase in O2 requirements. Dr. Hodge is OK with admission, and she will be admitted under hospitalist service overnight. Spoke directly with Dr. Martin who accepted the patient for hypoxia / obstructive sleep apnea workup. Patient is in agreement with the current plan of care and admission.    Surgical incisions c/d/I, patient states pain level is 4/10 which she further says is her baseline at home 2/2 chronic comorbidities. She is tolerating ice chips and fluid intake. Has not yet voided.    Joan Zheng, APRN  01/31/2025

## 2025-01-31 NOTE — FLOWSHEET NOTE
ADMISSION TO PACU     Patient: Gita Villalpando    Procedure(s):  HEMITHYROIDECTOMY; RIGHT    Level of consciousness: lethargic, oral airway  Nursing Assessment: incision to anterior throat - C/D/I. Ice pack applied  Pt remains very lethargic, will monitor closely.     Antonio PACU #1: Antonio Score: 4  1st PACU Vitals:   Vitals:    01/31/25 1159   BP: 135/73   Pulse: 99   Resp: 11   Temp: 97.6 °F (36.4 °C)   SpO2: 90%     Pain upon arrival to PACU: none per FLACC    Recent Labs     01/31/25  1010 01/31/25  1202   POCGLU 116* 166*       Lab Results   Component Value Date/Time    HGB 14.7 02/15/2024 11:10 PM     (L) 02/15/2024 11:10 PM    K 3.8 02/15/2024 11:10 PM    MG 1.60 (L) 05/08/2023 08:57 AM    CREATININE 0.6 02/15/2024 11:10 PM         Intake/Output Summary (Last 24 hours) at 1/31/2025 1207  Last data filed at 1/31/2025 1157  Gross per 24 hour   Intake 1100 ml   Output 50 ml   Net 1050 ml

## 2025-01-31 NOTE — BRIEF OP NOTE
Brief Postoperative Note      Patient: Gita Villalpando  YOB: 1975  MRN: [id number]    Date of Procedure: 1/31/2025    Pre-Op Diagnosis Codes:      * Right thyroid nodule [E04.1]    Post-Op Diagnosis: Same       Procedure(s):  HEMITHYROIDECTOMY; RIGHT    Surgeon(s):  Brent Hodge MD    Assistant:  Surgical Assistant: Kael Garcia  Resident: Edith Becker MD    Anesthesia: General    Estimated Blood Loss (mL): 25    Complications: None    Specimens:   ID Type Source Tests Collected by Time Destination   A : RIGHT HEMITHYROID Tissue Tissue SURGICAL PATHOLOGY Brent Hodge MD 1/31/2025 1121        Implants:  * No implants in log *      Drains: * No LDAs found *    Findings:  Infection Present At Time Of Surgery (PATOS) (choose all levels that have infection present):  No infection present  Other Findings: Very friable multinodular right thyroid    Electronically signed by Brent Hodge MD on 1/31/2025 at 11:30 AM

## 2025-01-31 NOTE — FLOWSHEET NOTE
PACU Transfer to [facility]    Pt meets criteria to transfer to next phase of care per OSCAR SCORE and ASPAN standards    Pain upon d/c from PACU:  denies pain  Oscar Phase I: Oscar Score: 9  Post-op Nausea & Vomiting: nausea and no vomiting  Last Vitals:   Vitals:    01/31/25 1435   BP: (!) 140/84   Pulse: (!) 105   Resp: 22   Temp: 98.2 °F (36.8 °C)   SpO2: (!) 89%       In: 1790 [P.O.:240; I.V.:1550]  Out: 50       Pt taken to [facility] #7 by [place]  Family/identified trustworthy individual(s) updated on POC and change in pt's location.

## 2025-01-31 NOTE — FLOWSHEET NOTE
Oxygen turned off.       01/31/25 1420   Vital Signs   Pulse (!) 106   Respirations 25   Oxygen Therapy   SpO2 91 %   O2 Device None (Room air)  (NC turned off)   O2 Flow Rate (L/min) 0 L/min

## 2025-01-31 NOTE — PROGRESS NOTES
Postoperative check:    Patient is hypoxic in PACU. Hx of SPRING/smoker. CXR wnl. Oxygenation improved with IS. Encouraged IS. Discussed with Dr. Hodge, would recommend admission to medicine for hypoxia workup  BP (!) 145/79   Pulse (!) 108   Temp 99.1 °F (37.3 °C) (Oral)   Resp 24   Ht 1.626 m (5' 4\")   Wt (!) 147.9 kg (326 lb)   LMP  (LMP Unknown)   SpO2 94%   BMI 55.96 kg/m²

## 2025-01-31 NOTE — DISCHARGE INSTRUCTIONS
1. Activity:  No lifting more than 10 lbs, straining, or strenuous activity until cleared by your surgeon.   Open mouth with sneezing    2. Diet:   Start with a clear liquid diet after surgery. Advance to your regular diet as tolerated.    3. Incision/wound care:    Do not shower for 24 hours.     4. Pain Management:   Ibuprofen, take three 200 mg tablets by mouth every 6 hours for pain.   Tylenol, take two 500 mg tablets every every 6 hours for pain   Oxycodone, 5 mg tablets. One tablet by mouth every 6 hours for breakthrough pain.     5. Follow up:  Please follow up as scheduled below or call [phone] to make an appointment for next week.     6. Other instructions:     Please go to the Emergency Room or call our clinic if you experience any of the following:   Worsening pain, nausea, or vomiting not relieved by medications.  If you develop weakness in your face or your face is not moving like normal.  Temperature greater than 101.5? F.   Redness around incision, drainage from incision, or wound edge separation  Increasing pain.    Chest pain, shortness of breath, persistent dizziness, swelling in one or both legs.  Severe headache or changes in vision.    Our clinic number is: [phone],  please call with any questions or concerns. Do not leave a message on a nurse or surgery scheduler's voicemail. In case of emergency after hours, the doctor can be reached by calling our main number [phone], and you will be directed to our on-call answering service.    Discharge Medications:  Ibuprofen (Advil or Motrin)(200mg tablets) 3 tablets by mouth every 6 hours as needed for pain  Tylenol (500mg tablet) 2 tablets by mouth every 6 hours as needed for pain.  Oxycodone, 5 mg tablets, 1 tablet by mouth every 6 hours as needed for breakthrough pain.       Appointments:  No future appointments.     Our clinic number is: [phone],  please call with questions or to confirm, change or make follow up appointments.

## 2025-01-31 NOTE — OP NOTE
Operative Note      Patient Name: Gita Villalpando  YOB: 1975  Medical Record Number:  [id number]  Billing Number:  [id number]  Date of Procedure: 1/31/2025  Time: 1045      DATE OF SURGERY:  1/31/2025     ATTENDING SURGEON:  Brent Hodge MD PHD  ASSISTANT: Jersonulator: Kathryn Valencia RN; Hannah Nguyen RN  Surgical Assistant: Kael Garcia  Scrub Person First: Rebecca Marmolejo  Resident: Edith Becker MD    PREOPERATIVE DIAGNOSES:  right Thyroid nodule [E04.1]     POSTOPERATIVE DIAGNOSES:  same as pre-op    PROCEDURE(S) PERFORMED:  Procedure(s):  right hemithyroidectomy (58449) (comp 39881)  Continuous Laryngeal Nerve Integrity Monitoring (44349)    ESTIMATED BLOOD LOSS:  less than 25 mL     SPECIMENS:    ID  Type  Source  Tests  Collected by  Time  Destination    A : Right Thyroid  Tissue  Thyroid  SURGICAL PATHOLOGY EXAM  Brent Hodge MD PHD  1/31/2025          COMPLICATIONS:  None.    ANESTHESIA:  General            Indications:  This is a 49 y.o. female with a history of right Thyroid nodule [E04.1].  After a discussion of risks, benefits, and alternate treatment options, the patient elected to proceed with left марина- possible total thyroidectomy.  Informed consent obtained.      DETAILS OF PROCEDURE(S):        The patient was brought to the operating room,    placed in a supine position, and induced and intubated per anesthesia. The    patient was intubated with a  Xomed nerve integrity monitoring    endotracheal tube. Direct laryngoscopy confirmed accurate placement of the    EMG contact electrodes to the vocalis muscles of the endolarynx bilaterally.    Subdermal ground electrodes were placed and all electrodes hooked up to the    nerve monitor, which was turned on and set for continuous laryngeal nerve    monitoring for the remainder of the  procedure. Only a short-acting    muscle relaxant was used for intubation, no further muscle relaxant given,    and no

## 2025-02-01 VITALS
RESPIRATION RATE: 20 BRPM | HEART RATE: 87 BPM | BODY MASS INDEX: 50.02 KG/M2 | SYSTOLIC BLOOD PRESSURE: 118 MMHG | WEIGHT: 293 LBS | TEMPERATURE: 97.9 F | DIASTOLIC BLOOD PRESSURE: 63 MMHG | HEIGHT: 64 IN | OXYGEN SATURATION: 93 %

## 2025-02-01 LAB
ANION GAP SERPL CALCULATED.3IONS-SCNC: 11 MMOL/L (ref 3–16)
BASOPHILS # BLD: 0.1 K/UL (ref 0–0.2)
BASOPHILS NFR BLD: 0.3 %
BUN SERPL-MCNC: 13 MG/DL (ref 7–20)
CALCIUM SERPL-MCNC: 9.3 MG/DL (ref 8.3–10.6)
CHLORIDE SERPL-SCNC: 106 MMOL/L (ref 99–110)
CO2 SERPL-SCNC: 20 MMOL/L (ref 21–32)
CREAT SERPL-MCNC: 0.7 MG/DL (ref 0.6–1.1)
DEPRECATED RDW RBC AUTO: 14.6 % (ref 12.4–15.4)
EOSINOPHIL # BLD: 0.2 K/UL (ref 0–0.6)
EOSINOPHIL NFR BLD: 1 %
GFR SERPLBLD CREATININE-BSD FMLA CKD-EPI: >90 ML/MIN/{1.73_M2}
GLUCOSE BLD-MCNC: 172 MG/DL (ref 70–99)
GLUCOSE BLD-MCNC: 194 MG/DL (ref 70–99)
GLUCOSE SERPL-MCNC: 190 MG/DL (ref 70–99)
HCT VFR BLD AUTO: 39.7 % (ref 36–48)
HGB BLD-MCNC: 12.9 G/DL (ref 12–16)
LYMPHOCYTES # BLD: 1.8 K/UL (ref 1–5.1)
LYMPHOCYTES NFR BLD: 11.5 %
MCH RBC QN AUTO: 31 PG (ref 26–34)
MCHC RBC AUTO-ENTMCNC: 32.4 G/DL (ref 31–36)
MCV RBC AUTO: 95.5 FL (ref 80–100)
MONOCYTES # BLD: 1 K/UL (ref 0–1.3)
MONOCYTES NFR BLD: 6.5 %
NEUTROPHILS # BLD: 12.9 K/UL (ref 1.7–7.7)
NEUTROPHILS NFR BLD: 80.7 %
PERFORMED ON: ABNORMAL
PERFORMED ON: ABNORMAL
PLATELET # BLD AUTO: 299 K/UL (ref 135–450)
PMV BLD AUTO: 7.7 FL (ref 5–10.5)
POTASSIUM SERPL-SCNC: 3.7 MMOL/L (ref 3.5–5.1)
RBC # BLD AUTO: 4.16 M/UL (ref 4–5.2)
SODIUM SERPL-SCNC: 137 MMOL/L (ref 136–145)
WBC # BLD AUTO: 15.9 K/UL (ref 4–11)

## 2025-02-01 PROCEDURE — 85025 COMPLETE CBC W/AUTO DIFF WBC: CPT

## 2025-02-01 PROCEDURE — 6370000000 HC RX 637 (ALT 250 FOR IP): Performed by: NURSE PRACTITIONER

## 2025-02-01 PROCEDURE — 94761 N-INVAS EAR/PLS OXIMETRY MLT: CPT

## 2025-02-01 PROCEDURE — 6370000000 HC RX 637 (ALT 250 FOR IP): Performed by: INTERNAL MEDICINE

## 2025-02-01 PROCEDURE — 94640 AIRWAY INHALATION TREATMENT: CPT

## 2025-02-01 PROCEDURE — 2500000003 HC RX 250 WO HCPCS: Performed by: INTERNAL MEDICINE

## 2025-02-01 PROCEDURE — 36415 COLL VENOUS BLD VENIPUNCTURE: CPT

## 2025-02-01 PROCEDURE — 80048 BASIC METABOLIC PNL TOTAL CA: CPT

## 2025-02-01 PROCEDURE — 6360000002 HC RX W HCPCS: Performed by: INTERNAL MEDICINE

## 2025-02-01 RX ORDER — IPRATROPIUM BROMIDE AND ALBUTEROL SULFATE 2.5; .5 MG/3ML; MG/3ML
1 SOLUTION RESPIRATORY (INHALATION)
Status: DISCONTINUED | OUTPATIENT
Start: 2025-02-01 | End: 2025-02-01

## 2025-02-01 RX ORDER — OXYCODONE HYDROCHLORIDE 5 MG/1
5 TABLET ORAL ONCE
Status: COMPLETED | OUTPATIENT
Start: 2025-02-01 | End: 2025-02-01

## 2025-02-01 RX ORDER — OXYCODONE HYDROCHLORIDE 5 MG/1
5 TABLET ORAL EVERY 6 HOURS PRN
Status: DISCONTINUED | OUTPATIENT
Start: 2025-02-01 | End: 2025-02-01 | Stop reason: HOSPADM

## 2025-02-01 RX ORDER — MORPHINE SULFATE 2 MG/ML
1 INJECTION, SOLUTION INTRAMUSCULAR; INTRAVENOUS ONCE
Status: DISCONTINUED | OUTPATIENT
Start: 2025-02-01 | End: 2025-02-01

## 2025-02-01 RX ORDER — IPRATROPIUM BROMIDE AND ALBUTEROL SULFATE 2.5; .5 MG/3ML; MG/3ML
1 SOLUTION RESPIRATORY (INHALATION)
Status: DISCONTINUED | OUTPATIENT
Start: 2025-02-01 | End: 2025-02-01 | Stop reason: HOSPADM

## 2025-02-01 RX ADMIN — OXYCODONE HYDROCHLORIDE 5 MG: 5 TABLET ORAL at 08:58

## 2025-02-01 RX ADMIN — OXYCODONE HYDROCHLORIDE 5 MG: 5 TABLET ORAL at 14:34

## 2025-02-01 RX ADMIN — OXYCODONE HYDROCHLORIDE 5 MG: 5 TABLET ORAL at 14:46

## 2025-02-01 RX ADMIN — ENOXAPARIN SODIUM 30 MG: 100 INJECTION SUBCUTANEOUS at 09:04

## 2025-02-01 RX ADMIN — QUETIAPINE FUMARATE 200 MG: 200 TABLET ORAL at 08:58

## 2025-02-01 RX ADMIN — IPRATROPIUM BROMIDE AND ALBUTEROL SULFATE 1 DOSE: 2.5; .5 SOLUTION RESPIRATORY (INHALATION) at 09:31

## 2025-02-01 RX ADMIN — BUSPIRONE HYDROCHLORIDE 15 MG: 10 TABLET ORAL at 14:36

## 2025-02-01 RX ADMIN — BUPROPION HYDROCHLORIDE 450 MG: 150 TABLET, EXTENDED RELEASE ORAL at 08:58

## 2025-02-01 RX ADMIN — INSULIN GLARGINE 10 UNITS: 100 INJECTION, SOLUTION SUBCUTANEOUS at 09:19

## 2025-02-01 RX ADMIN — BUSPIRONE HYDROCHLORIDE 15 MG: 10 TABLET ORAL at 09:12

## 2025-02-01 RX ADMIN — LITHIUM CARBONATE 450 MG: 450 TABLET, EXTENDED RELEASE ORAL at 09:12

## 2025-02-01 RX ADMIN — SODIUM CHLORIDE, PRESERVATIVE FREE 10 ML: 5 INJECTION INTRAVENOUS at 09:13

## 2025-02-01 ASSESSMENT — PAIN SCALES - GENERAL
PAINLEVEL_OUTOF10: 3
PAINLEVEL_OUTOF10: 8
PAINLEVEL_OUTOF10: 7
PAINLEVEL_OUTOF10: 3
PAINLEVEL_OUTOF10: 7
PAINLEVEL_OUTOF10: 3
PAINLEVEL_OUTOF10: 7

## 2025-02-01 ASSESSMENT — PAIN - FUNCTIONAL ASSESSMENT: PAIN_FUNCTIONAL_ASSESSMENT: ACTIVITIES ARE NOT PREVENTED

## 2025-02-01 ASSESSMENT — PAIN DESCRIPTION - LOCATION
LOCATION: NECK

## 2025-02-01 ASSESSMENT — PAIN DESCRIPTION - DESCRIPTORS
DESCRIPTORS: SHARP

## 2025-02-01 ASSESSMENT — PAIN DESCRIPTION - ORIENTATION
ORIENTATION: ANTERIOR
ORIENTATION: LEFT

## 2025-02-01 ASSESSMENT — PAIN DESCRIPTION - PAIN TYPE: TYPE: SURGICAL PAIN

## 2025-02-01 ASSESSMENT — PAIN DESCRIPTION - FREQUENCY: FREQUENCY: CONTINUOUS

## 2025-02-01 ASSESSMENT — PAIN DESCRIPTION - ONSET: ONSET: ON-GOING

## 2025-02-01 NOTE — RT PROTOCOL NOTE
RT Inhaler-Nebulizer Bronchodilator Protocol Note    There is a bronchodilator order in the chart from a provider indicating to follow the RT Bronchodilator Protocol and there is an “Initiate RT Inhaler-Nebulizer Bronchodilator Protocol” order as well (see protocol at bottom of note).    CXR Findings:  XR CHEST PORTABLE    Result Date: 1/31/2025  No acute disease Electronically signed by Mo Edwards      The findings from the last RT Protocol Assessment were as follows:   History Pulmonary Disease: Chronic pulmonary disease  Respiratory Pattern: Mild dyspnea at rest, irregular pattern, or RR 21-25 bpm  Breath Sounds: Slightly diminished and/or crackles  Cough: Strong, spontaneous, non-productive  Indication for Bronchodilator Therapy:    Bronchodilator Assessment Score: 8    Aerosolized bronchodilator medication orders have been revised according to the RT Inhaler-Nebulizer Bronchodilator Protocol below.    Respiratory Therapist to perform RT Therapy Protocol Assessment initially then follow the protocol.  Repeat RT Therapy Protocol Assessment PRN for score 0-3 or on second treatment, BID, and PRN for scores above 3.    No Indications - adjust the frequency to every 6 hours PRN wheezing or bronchospasm, if no treatments needed after 48 hours then discontinue using Per Protocol order mode.     If indication present, adjust the RT bronchodilator orders based on the Bronchodilator Assessment Score as indicated below.  Use Inhaler orders unless patient has one or more of the following: on home nebulizer, not able to hold breath for 10 seconds, is not alert and oriented, cannot activate and use MDI correctly, or respiratory rate 25 breaths per minute or more, then use the equivalent nebulizer order(s) with same Frequency and PRN reasons based on the score.  If a patient is on this medication at home then do not decrease Frequency below that used at home.    0-3 - enter or revise RT bronchodilator order(s) to

## 2025-02-01 NOTE — PROGRESS NOTES
Otolaryngology Surgery   Daily Progress Note  Patient: Gita Villalpando      CC: Right thyroid nodule    SUBJECTIVE:   Patient rested well overnight. No symptoms of hypocalcemia    ROS:   A 14 point review of systems was conducted, significant findings as noted above. All other systems negative.    OBJECTIVE:    PHYSICAL EXAM:    Vitals:    01/31/25 2022 01/31/25 2243 01/31/25 2353 02/01/25 0232   BP:  (!) 149/73  (!) 155/83   Pulse:  100  89   Resp: 20 22 18 20   Temp:  98 °F (36.7 °C)  98 °F (36.7 °C)   TempSrc:  Oral  Oral   SpO2:  96%  97%   Weight:       Height:           General appearance: alert, no acute distress  Eyes: No scleral icterus, EOM grossly intact  Neck: trachea midline, no JVD, neck supple  Chest/Lungs: Normal effort with no accessory muscle use on 3L NC  Cardiovascular: RRR  Skin: warm and dry, no rashes  Extremities: no edema, no cyanosis  Genitourinary: Grossly normal  Neuro: A&Ox3, no focal deficits, sensation intact      ASSESSMENT & PLAN:   This is a 49 y.o. female with a diagnosis of right Thyroid nodule s/p hemithyroidectomy; right     - Pulmonary to evaluate patient today, f/u recs  - Discharge pending patient stability off of oxygen - O2 sat >88%  - Encourage IS, OOB , ambulation   - Rest of care and dispo per primary     Lani Yan MD  PGY1, General Surgery  02/01/25  6:10 AM  [phone]

## 2025-02-01 NOTE — PROGRESS NOTES
Patient alert and oriented x4. Patient denies any nausea. Surgical pain of neck managed with PRN dilaudid and Patient satisfied. No hoarse voice noted. No complain of tingling in the hands, feet, or around the mouth. Walked in room to bathroom, patient refused to walk in hallway. Assessment done.see flowsheet. Patient in bed lowest position call light and bedside table within reach. All needs are met at this time. Patient aware to call if any help needed.Plan of care ongoing. BP (!) 155/83   Pulse 89   Temp 98 °F (36.7 °C) (Oral)   Resp 20   Ht 1.626 m (5' 4\")   Wt (!) 147.9 kg (326 lb)   LMP  (LMP Unknown)   SpO2 97%   BMI 55.96 kg/m²

## 2025-02-01 NOTE — CONSULTS
Clinical Pharmacy Consult Note  Medication History     Admit Date: 1/31/2025    Pharmacy consulted to verify home medication list by Wandy May MD    List of current medications patient is taking is complete. Home Medication list in Epic updated to reflect changes noted below.    Source of information: Rx Dispensed Report, Chart Review Notes, and In-person patient interview    Patient's home pharmacy: [facility] [id number] [place] - P [phone] - F [phone]     Changes made to medication list:     Medications removed: (include reason, ex: therapy completed, patient no longer taking, etc.)  Naltrexone, patient no longer taking    Medications added:   Albuterol Inhaler    Medication doses adjusted:   Fluticasone NS, patient uses 2 sprays into each nostril twice daily instead of 1 spray twice daily.     Other notes:   Quetiapine, patient takes total dose of 200 mg in the morning and total dose of 600 mg in the evening.    [facility], patient reports that she stays at Group home and all of her medications are managed and given by the Group Home.     Current Outpatient Medications   Medication Instructions    BREO ELLIPTA 100-25 MCG/ACT inhaler Inhale 1 puff into the lungs daily    buPROPion (WELLBUTRIN XL) 450 mg, Oral, EVERY MORNING    busPIRone (BUSPAR) 15 mg, 3 TIMES DAILY    fluticasone (FLONASE) 50 MCG/ACT nasal spray 2 sprays, Nasal, 2 times daily    HUMALOG KWIKPEN 100 UNIT/ML SOPN 10 units with meals and bedtime plus sliding scale as below:6 units if 160-2208 units if [phone] units if [phone] units if [phone] units and call provider if over 400    lisinopril (PRINIVIL;ZESTRIL) 5 MG tablet Take 1 tablet every day by oral route for 30 days.    lithium (ESKALITH) 450 MG extended release tablet Oral, 2 TIMES DAILY    omeprazole (PRILOSEC) 20 mg, Oral, DAILY    QUEtiapine (SEROQUEL) 200 MG tablet Take 1 tablet by mouth 2 times daily Take

## 2025-02-01 NOTE — DISCHARGE SUMMARY
V2.0  Discharge Summary    Name:  Gita Villalpando /Age/Sex: 1975 (49 y.o. female)   Admit Date: 2025  Discharge Date: 25    MRN & CSN:  [id number] & [id number] Encounter Date and Time 25 12:19 PM EST    Attending:  Javier Johnson MD Discharging Provider: SUZANNA Ricks - CNP       Hospital Course:     Brief HPI: Gita Villalpando is a 49 y.o. female who presented with severe borderline personality disorder, bipolar disorder, DM2, HLD, morbid obesity and enlargening thyroid nodules, who was admitted for elective hemithyroidectomy, noted with postop hypoxia.     Brief Problem Based Course:     Thyroid nodules, status post right hemithyroidectomy with Continuous Laryngeal Nerve Integrity Monitoring   - Surgery by Dr. Hodge, under general anesthesia with EBL less than 25 mL; seen by Dr. Hodge this morning patient is stable for     Postop hypoxia-resolved patient is back to room air  -Noted postop with hypoxemia, requiring 5 L of oxygen     Asthma, on Breo  Tobacco abuse hx     Severe borderline personality disorder  MDD/bipolar affective disorder  History of lithium toxicity requiring intubation for encephalopathy  -On multiple psychiatric medications including lithium; previous History of multiple suicidal gestures/attempts-continued end-stage     DM type 2 on long-term insulin     Essential HTN     Hx of Polysubstance abuse including cocaine, PCP and methadone  - Appears to be on naltrexone     Morbid obesity Body mass index is 55.96 kg/m². - Complicating assessment and treatment. Placing patient at risk for multiple co-morbidities as well as early death and contributing to the patient's presentation.  on weight loss      The patient expressed appropriate understanding of, and agreement with the discharge recommendations, medications, and plan.     Consults this admission:  IP CONSULT TO HOSPITALIST  IP CONSULT TO PHARMACY    Discharge Diagnosis:   Right thyroid       Phone: [phone]   oxyCODONE 5 MG immediate release tablet        Objective Findings at Discharge:   /63   Pulse 87   Temp 97.9 °F (36.6 °C) (Oral)   Resp 20   Ht 1.626 m (5' 4\")   Wt (!) 147.9 kg (326 lb)   LMP  (LMP Unknown)   SpO2 93%   BMI 55.96 kg/m²       Physical Exam:     General: NAD  Eyes: EOMI  ENT: neck supple  Cardiovascular: Regular rate.  Respiratory: Clear to auscultation  Gastrointestinal: Soft, non tender  Genitourinary: no suprapubic tenderness  Musculoskeletal: No edema  Skin: warm, dry  Neuro: Alert.  Psych: Mood appropriate.         Labs and Imaging   XR CHEST PORTABLE    Result Date: 1/31/2025  1 view chest HISTORY: Hypoxia COMPARISON: None FINDINGS: The cardiomediastinal contours are normal.  The lungs are clear.  No pleural abnormality is evident.     No acute disease Electronically signed by Mo Edwards      CBC:   Recent Labs     02/01/25  0645   WBC 15.9*   HGB 12.9        BMP:    Recent Labs     01/31/25  2102 02/01/25  0645   * 137   K 4.7 3.7    106   CO2 19* 20*   BUN 12 13   CREATININE 0.9 0.7   GLUCOSE 321* 190*     Hepatic:   Recent Labs     01/31/25 2102   AST 43*   ALT 36   BILITOT <0.2   ALKPHOS 119     Lipids:   Lab Results   Component Value Date/Time    CHOL 224 01/20/2017 05:10 AM    HDL 39 01/20/2017 05:10 AM    HDL 66 06/01/2012 12:00 PM    TRIG 467 01/20/2017 05:10 AM     Hemoglobin A1C:   Lab Results   Component Value Date/Time    LABA1C 9.2 07/21/2017 03:00 AM     TSH:   Lab Results   Component Value Date/Time    TSH 2.39 03/15/2016 10:06 AM    TSH 1.56 12/02/2013 08:29 PM     Troponin: No results found for: \"TROPONINT\"  Lactic Acid: No results for input(s): \"LACTA\" in the last 72 hours.  BNP: No results for input(s): \"PROBNP\" in the last 72 hours.  UA:  Lab Results   Component Value Date/Time    NITRU Negative 02/15/2024 11:10 PM    COLORU Yellow 02/15/2024 11:10 PM    PHUR 7.0 01/31/2025 08:30 PM    PHUR 6.0 02/15/2024

## 2025-02-01 NOTE — CARE COORDINATION
Case Management Assessment            Discharge Note                    Date / Time of Note: 2/1/2025 3:14 PM                  Discharge Note Completed by: HERNANDEZ Triana    Patient Name: Gita Villalpando   YOB: 1975  Diagnosis: Right thyroid nodule [E04.1]  Postoperative hypoxia [R09.02, Z98.890]   Date / Time: 1/31/2025  8:52 AM    Current PCP: Denise Chase APRN - NP  Clinic patient: Yes    Hospitalization in the last 30 days: Yes       Advance Directives:  Code Status: Full Code  [place] DNR form completed and on chart: Not Indicated    Financial:  Payor: [facility] DUAL BENEFITS / Plan: [employer]WE[employer] FROM [employer] PLAN / Product Type: *No Product type* /      Pharmacy:    [facility] [facility] [address] [facility] [phone] - F [phone]  [address] [facility] [address]  Phone: [phone] Fax: [phone]      Assistance purchasing medications?:    Assistance provided by Case Management: None at this time    Does patient want to participate in local refill/ meds to beds program?: Yes    Meds To Beds General Rules:  1. Can ONLY be done Monday- Friday between 8:30am-5pm  2. Prescription(s) must be in pharmacy by 3pm to be filled same day  3.Copy of patient's insurance/ prescription drug card and patient face sheet must be sent along with the prescription(s)  4. Cost of Rx cannot be added to hospital bill. If financial assistance is needed, please contact unit  or ;  or  CANNOT provide pharmacy voucher for patients co-pays  5. Patients can then  the prescription on their way out of the hospital at discharge, or pharmacy can deliver to the bedside if staff is available. (payment due at time of pick-up or delivery - cash, check, or card accepted)     Able to afford home medications/ co-pay costs: Yes    ADLS:  Current PT AM-PAC Score:   /24  Current OT AM-PAC Score:

## 2025-02-02 LAB
EKG ATRIAL RATE: 105 BPM
EKG DIAGNOSIS: NORMAL
EKG P AXIS: 49 DEGREES
EKG P-R INTERVAL: 196 MS
EKG Q-T INTERVAL: 334 MS
EKG QRS DURATION: 88 MS
EKG QTC CALCULATION (BAZETT): 441 MS
EKG R AXIS: 60 DEGREES
EKG T AXIS: 24 DEGREES
EKG VENTRICULAR RATE: 105 BPM

## 2025-02-06 ENCOUNTER — OFFICE VISIT (OUTPATIENT)
Dept: ENT CLINIC | Age: 50
End: 2025-02-06

## 2025-02-06 VITALS — TEMPERATURE: 97.7 F | SYSTOLIC BLOOD PRESSURE: 116 MMHG | DIASTOLIC BLOOD PRESSURE: 68 MMHG | HEART RATE: 97 BPM

## 2025-02-06 DIAGNOSIS — Z48.89 POSTOPERATIVE VISIT: Primary | ICD-10-CM

## 2025-02-06 PROCEDURE — 99024 POSTOP FOLLOW-UP VISIT: CPT | Performed by: OTOLARYNGOLOGY

## 2025-02-06 NOTE — PROGRESS NOTES
Status post марина thyroidectomy.  Doing well. No pain. Voice is good.     Pe: Incision clean, dry and intact.     Mirror exam was performed.  The nasopharynx, larynx,or hypopharynx were examined.  Vocal fold motion was examined.      Pertinent findings include: normal laryngeal motion    A/P: Follow up in 5 weeks for incision check and TSH levels.

## 2025-03-13 ENCOUNTER — OFFICE VISIT (OUTPATIENT)
Dept: ENT CLINIC | Age: 50
End: 2025-03-13

## 2025-03-13 VITALS — SYSTOLIC BLOOD PRESSURE: 134 MMHG | DIASTOLIC BLOOD PRESSURE: 81 MMHG | HEART RATE: 94 BPM | TEMPERATURE: 97.1 F

## 2025-03-13 DIAGNOSIS — E04.1 RIGHT THYROID NODULE: ICD-10-CM

## 2025-03-13 DIAGNOSIS — E04.1 RIGHT THYROID NODULE: Primary | ICD-10-CM

## 2025-03-13 LAB — TSH SERPL DL<=0.005 MIU/L-ACNC: 3.53 UIU/ML (ref 0.27–4.2)

## 2025-03-13 PROCEDURE — 99024 POSTOP FOLLOW-UP VISIT: CPT | Performed by: OTOLARYNGOLOGY

## 2025-03-13 RX ORDER — LEVOTHYROXINE SODIUM 50 UG/1
TABLET ORAL
COMMUNITY
Start: 2025-02-19

## 2025-03-13 RX ORDER — ATORVASTATIN CALCIUM 20 MG/1
TABLET, FILM COATED ORAL
COMMUNITY
Start: 2025-02-18

## 2025-03-13 NOTE — PROGRESS NOTES
Status post R hemithyroidectomy thyroidectomy.  Doing well. No pain. Voice is good.     Pe: Incision clean, dry and intact.     Mirror exam was performed.  The nasopharynx, larynx,or hypopharynx were examined.  Vocal fold motion was examined.      Pertinent findings include: normal laryngeal motion    A/P: Follow up in 5 weeks for incision check and TSH levels.        Surgical Pathology  Order: [id number]   Status: Final result       Next appt: None    Test Result Released: Yes (not seen)    0 Result Notes        Narrative  Performed by: [facility] [address]                                        [place]    [address]                                       Fax [phone]  [phone]  Department of Pathology  FINAL SURGICAL PATHOLOGY REPORT  Patient Name:  BOB GOODMAN          Accession No:  [id number]   Age Sex:   1975    49 Y / F      Location:      [facility] 01  Account No:    [id number]                 Collected:     2025  Med Rec No:    [id number]                Received:      2025  Attend Phys:   KAMINI LUND MD              Completed:     2025  Perform Phys:  KAMINI LUND MD            FINAL DIAGNOSIS:    Thyroid, right hemithyroidectomy:  -Benign multinodular hyperplasia.  -Single reactive lymph node.     GILMA/GILMA

## 2025-03-14 ENCOUNTER — RESULTS FOLLOW-UP (OUTPATIENT)
Dept: ENT CLINIC | Age: 50
End: 2025-03-14

## 2025-03-14 NOTE — RESULT ENCOUNTER NOTE
3-  patient informed of test results and per Dr Hodge's instructions per patient no further workup needed if test was normal.  joseph  
Digestive

## 2025-04-07 ENCOUNTER — HOSPITAL ENCOUNTER (OUTPATIENT)
Age: 50
Discharge: HOME | End: 2025-04-07

## 2025-04-07 LAB
EKG ATRIAL RATE: 85 BPM
EKG DIAGNOSIS: NORMAL
EKG P AXIS: 43 DEGREES
EKG P-R INTERVAL: 186 MS
EKG Q-T INTERVAL: 378 MS
EKG QRS DURATION: 96 MS
EKG QTC CALCULATION (BAZETT): 449 MS
EKG R AXIS: 53 DEGREES
EKG T AXIS: 29 DEGREES
EKG VENTRICULAR RATE: 85 BPM

## 2025-04-29 NOTE — ANESTHESIA PRE PROCEDURE
Department of Anesthesiology  Preprocedure Note       Name:  Gita Villalpando   Age:  49 y.o.  :  1975                                          MRN:  [id number]         Date:  2025      Surgeon: Surgeon(s):  Brent Hodge MD    Procedure: Procedure(s):  HEMITHYROIDECTOMY; RIGHT, POSSIBLE TOTAL    Medications prior to admission:   Prior to Admission medications    Medication Sig Start Date End Date Taking? Authorizing Provider   lisinopril (PRINIVIL;ZESTRIL) 10 MG tablet Take 0.5 tablets by mouth daily    Chantell Walker MD   Alcohol Swabs 70 % PADS Apply 1 pad 4 times a day by topical route as directed. 10/17/23   Chantell Walker MD   busPIRone (BUSPAR) 15 MG tablet 15 mg 3 times daily    Chantell Walker MD   BREO ELLIPTA 100-25 MCG/ACT inhaler 1 puff daily 24   Chantell Walker MD   fluticasone (FLONASE) 50 MCG/ACT nasal spray 1 spray by Nasal route daily 10/12/23   Chantell Walker MD   blood glucose monitor strips Use to test before meals and before bed (4x/day) 10/29/23   Chantell Walker MD   TRESIBA FLEXTOUCH 200 UNIT/ML SOPN 70 Units in the morning and at bedtime 9/15/24   Chantell Walker MD   HUMALOG KWIKPEN 100 UNIT/ML SOPN 10 units with meals and bedtime plus sliding scale as below:6 units if 160-2208 units if 221-88189 units if 281-55344 units if 341-16518 units and call provider if over 400 11/3/23   Chantell Walker MD   BD PEN NEEDLE EZEQUIEL 2ND GEN 32G X 4 MM MISC  24   Chantell Walker MD   lisinopril (PRINIVIL;ZESTRIL) 5 MG tablet Take 1 tablet every day by oral route for 30 days. 24   Chantell Walker MD   lithium (ESKALITH) 450 MG extended release tablet Take by mouth 2 times daily    Chantell Walker MD   QUEtiapine (SEROQUEL) 200 MG tablet Take 1 tablet by mouth daily    Chantell Walker MD   QUEtiapine (SEROQUEL) 400 MG tablet Take 1 tablet every day by oral route at bedtime for 30 days. 3/25/24   No care due was identified.  [facility] Embedded Care Due Messages. Reference number: [id number].   4/28/2025 9:20:07 PM CDT

## 2025-07-15 ENCOUNTER — HOSPITAL ENCOUNTER (EMERGENCY)
Age: 50
Discharge: PSYCHIATRIC OR SUBSTANCE USE FACILITY | End: 2025-07-16
Attending: EMERGENCY MEDICINE
Payer: MEDICARE

## 2025-07-15 DIAGNOSIS — T50.902A SUICIDE ATTEMPT BY DRUG INGESTION, INITIAL ENCOUNTER (HCC): Primary | ICD-10-CM

## 2025-07-15 DIAGNOSIS — T50.902A INTENTIONAL DRUG OVERDOSE, INITIAL ENCOUNTER (HCC): ICD-10-CM

## 2025-07-15 DIAGNOSIS — R45.851 SUICIDAL IDEATION: ICD-10-CM

## 2025-07-15 PROCEDURE — 80179 DRUG ASSAY SALICYLATE: CPT

## 2025-07-15 PROCEDURE — 85025 COMPLETE CBC W/AUTO DIFF WBC: CPT

## 2025-07-15 PROCEDURE — 84703 CHORIONIC GONADOTROPIN ASSAY: CPT

## 2025-07-15 PROCEDURE — 36415 COLL VENOUS BLD VENIPUNCTURE: CPT

## 2025-07-15 PROCEDURE — 93005 ELECTROCARDIOGRAM TRACING: CPT | Performed by: PHYSICIAN ASSISTANT

## 2025-07-15 PROCEDURE — 80143 DRUG ASSAY ACETAMINOPHEN: CPT

## 2025-07-15 PROCEDURE — 80053 COMPREHEN METABOLIC PANEL: CPT

## 2025-07-15 PROCEDURE — 99285 EMERGENCY DEPT VISIT HI MDM: CPT

## 2025-07-15 PROCEDURE — 82077 ASSAY SPEC XCP UR&BREATH IA: CPT

## 2025-07-15 PROCEDURE — 80178 ASSAY OF LITHIUM: CPT

## 2025-07-15 ASSESSMENT — PAIN - FUNCTIONAL ASSESSMENT: PAIN_FUNCTIONAL_ASSESSMENT: 0-10

## 2025-07-15 ASSESSMENT — PAIN SCALES - GENERAL
PAINLEVEL_OUTOF10: 0
PAINLEVEL_OUTOF10: 0

## 2025-07-16 ENCOUNTER — HOSPITAL ENCOUNTER (INPATIENT)
Age: 50
LOS: 1 days | Discharge: HOME | DRG: 881 | End: 2025-07-17
Attending: PSYCHIATRY & NEUROLOGY | Admitting: PSYCHIATRY & NEUROLOGY
Payer: MEDICARE

## 2025-07-16 VITALS
HEART RATE: 97 BPM | DIASTOLIC BLOOD PRESSURE: 72 MMHG | SYSTOLIC BLOOD PRESSURE: 142 MMHG | TEMPERATURE: 98.4 F | RESPIRATION RATE: 21 BRPM | OXYGEN SATURATION: 91 %

## 2025-07-16 PROBLEM — F32.A DEPRESSION, UNSPECIFIED: Status: ACTIVE | Noted: 2025-07-16

## 2025-07-16 PROBLEM — E03.9 ACQUIRED HYPOTHYROIDISM: Status: ACTIVE | Noted: 2025-07-16

## 2025-07-16 PROBLEM — I10 HTN (HYPERTENSION): Status: ACTIVE | Noted: 2025-07-16

## 2025-07-16 LAB
ALBUMIN SERPL-MCNC: 4 G/DL (ref 3.4–5)
ALBUMIN/GLOB SERPL: 1.3 {RATIO} (ref 1.1–2.2)
ALP SERPL-CCNC: 109 U/L (ref 40–129)
ALT SERPL-CCNC: 11 U/L (ref 10–40)
AMPHETAMINES UR QL SCN>1000 NG/ML: NORMAL
ANION GAP SERPL CALCULATED.3IONS-SCNC: 15 MMOL/L (ref 3–16)
APAP SERPL-MCNC: <5 UG/ML (ref 10–30)
AST SERPL-CCNC: 14 U/L (ref 15–37)
BACTERIA URNS QL MICRO: ABNORMAL /HPF
BARBITURATES UR QL SCN>200 NG/ML: NORMAL
BASOPHILS # BLD: 0.1 K/UL (ref 0–0.2)
BASOPHILS NFR BLD: 0.6 %
BENZODIAZ UR QL SCN>200 NG/ML: NORMAL
BILIRUB SERPL-MCNC: 0.3 MG/DL (ref 0–1)
BILIRUB UR QL STRIP.AUTO: NEGATIVE
BUN SERPL-MCNC: 16 MG/DL (ref 7–20)
CALCIUM SERPL-MCNC: 10 MG/DL (ref 8.3–10.6)
CANNABINOIDS UR QL SCN>50 NG/ML: NORMAL
CHARACTER UR: ABNORMAL
CHLORIDE SERPL-SCNC: 102 MMOL/L (ref 99–110)
CLARITY UR: ABNORMAL
CO2 SERPL-SCNC: 19 MMOL/L (ref 21–32)
COCAINE UR QL SCN: NORMAL
COLOR UR: YELLOW
CREAT SERPL-MCNC: 1.1 MG/DL (ref 0.6–1.1)
DEPRECATED RDW RBC AUTO: 14.7 % (ref 12.4–15.4)
DRUG SCREEN COMMENT UR-IMP: NORMAL
EKG ATRIAL RATE: 105 BPM
EKG DIAGNOSIS: NORMAL
EKG P AXIS: 39 DEGREES
EKG P-R INTERVAL: 180 MS
EKG Q-T INTERVAL: 348 MS
EKG QRS DURATION: 88 MS
EKG QTC CALCULATION (BAZETT): 459 MS
EKG R AXIS: 83 DEGREES
EKG T AXIS: 18 DEGREES
EKG VENTRICULAR RATE: 105 BPM
EOSINOPHIL # BLD: 0.3 K/UL (ref 0–0.6)
EOSINOPHIL NFR BLD: 2.4 %
EPI CELLS #/AREA URNS HPF: ABNORMAL /HPF (ref 0–5)
ETHANOLAMINE SERPL-MCNC: NORMAL MG/DL (ref 0–0.08)
FENTANYL SCREEN, URINE: NORMAL
GFR SERPLBLD CREATININE-BSD FMLA CKD-EPI: 61 ML/MIN/{1.73_M2}
GLUCOSE BLD-MCNC: 200 MG/DL (ref 70–99)
GLUCOSE BLD-MCNC: 202 MG/DL (ref 70–99)
GLUCOSE BLD-MCNC: 284 MG/DL (ref 70–99)
GLUCOSE BLD-MCNC: 291 MG/DL (ref 70–99)
GLUCOSE SERPL-MCNC: 311 MG/DL (ref 70–99)
GLUCOSE UR STRIP.AUTO-MCNC: NEGATIVE MG/DL
HCG SERPL QL: NEGATIVE
HCT VFR BLD AUTO: 41.1 % (ref 36–48)
HGB BLD-MCNC: 13.8 G/DL (ref 12–16)
HGB UR QL STRIP.AUTO: ABNORMAL
HYALINE CASTS #/AREA URNS LPF: ABNORMAL /LPF (ref 0–2)
KETONES UR STRIP.AUTO-MCNC: ABNORMAL MG/DL
LEUKOCYTE ESTERASE UR QL STRIP.AUTO: ABNORMAL
LITHIUM DOSE: NORMAL MG
LITHIUM SERPL-MCNC: 0.9 MMOL/L (ref 0.6–1.2)
LYMPHOCYTES # BLD: 1.5 K/UL (ref 1–5.1)
LYMPHOCYTES NFR BLD: 12.5 %
MCH RBC QN AUTO: 31.5 PG (ref 26–34)
MCHC RBC AUTO-ENTMCNC: 33.6 G/DL (ref 31–36)
MCV RBC AUTO: 93.7 FL (ref 80–100)
METHADONE UR QL SCN>300 NG/ML: NORMAL
MONOCYTES # BLD: 0.7 K/UL (ref 0–1.3)
MONOCYTES NFR BLD: 5.7 %
MUCOUS THREADS #/AREA URNS LPF: ABNORMAL /LPF
NEUTROPHILS # BLD: 9.6 K/UL (ref 1.7–7.7)
NEUTROPHILS NFR BLD: 78.8 %
NITRITE UR QL STRIP.AUTO: NEGATIVE
OPIATES UR QL SCN>300 NG/ML: NORMAL
OXYCODONE UR QL SCN: NORMAL
PCP UR QL SCN>25 NG/ML: NORMAL
PERFORMED ON: ABNORMAL
PH UR STRIP.AUTO: 6 [PH] (ref 5–8)
PH UR STRIP: 6 [PH]
PLATELET # BLD AUTO: 286 K/UL (ref 135–450)
PMV BLD AUTO: 8.2 FL (ref 5–10.5)
POTASSIUM SERPL-SCNC: 3.8 MMOL/L (ref 3.5–5.1)
PROT SERPL-MCNC: 7.1 G/DL (ref 6.4–8.2)
PROT UR STRIP.AUTO-MCNC: 300 MG/DL
RBC # BLD AUTO: 4.38 M/UL (ref 4–5.2)
RBC #/AREA URNS HPF: ABNORMAL /HPF (ref 0–4)
SALICYLATES SERPL-MCNC: <0.5 MG/DL (ref 15–30)
SODIUM SERPL-SCNC: 136 MMOL/L (ref 136–145)
SP GR UR STRIP.AUTO: 1.02 (ref 1–1.03)
UA COMPLETE W REFLEX CULTURE PNL UR: YES
UA DIPSTICK W REFLEX MICRO PNL UR: YES
URN SPEC COLLECT METH UR: ABNORMAL
UROBILINOGEN UR STRIP-ACNC: 1 E.U./DL
WBC # BLD AUTO: 12.1 K/UL (ref 4–11)
WBC #/AREA URNS HPF: ABNORMAL /HPF (ref 0–5)

## 2025-07-16 PROCEDURE — 80307 DRUG TEST PRSMV CHEM ANLYZR: CPT

## 2025-07-16 PROCEDURE — 6370000000 HC RX 637 (ALT 250 FOR IP): Performed by: PSYCHIATRY & NEUROLOGY

## 2025-07-16 PROCEDURE — 93010 ELECTROCARDIOGRAM REPORT: CPT | Performed by: INTERNAL MEDICINE

## 2025-07-16 PROCEDURE — 1240000000 HC EMOTIONAL WELLNESS R&B

## 2025-07-16 PROCEDURE — 87086 URINE CULTURE/COLONY COUNT: CPT

## 2025-07-16 PROCEDURE — 81001 URINALYSIS AUTO W/SCOPE: CPT

## 2025-07-16 PROCEDURE — 99222 1ST HOSP IP/OBS MODERATE 55: CPT

## 2025-07-16 PROCEDURE — 6370000000 HC RX 637 (ALT 250 FOR IP)

## 2025-07-16 RX ORDER — ATORVASTATIN CALCIUM 10 MG/1
20 TABLET, FILM COATED ORAL DAILY
Status: DISCONTINUED | OUTPATIENT
Start: 2025-07-16 | End: 2025-07-16

## 2025-07-16 RX ORDER — LEVOTHYROXINE SODIUM 100 UG/1
100 TABLET ORAL
Status: DISCONTINUED | OUTPATIENT
Start: 2025-07-17 | End: 2025-07-17 | Stop reason: HOSPADM

## 2025-07-16 RX ORDER — ERGOCALCIFEROL 1.25 MG/1
50000 CAPSULE, LIQUID FILLED ORAL WEEKLY
COMMUNITY

## 2025-07-16 RX ORDER — INSULIN LISPRO 100 [IU]/ML
14 INJECTION, SOLUTION INTRAVENOUS; SUBCUTANEOUS
Status: DISCONTINUED | OUTPATIENT
Start: 2025-07-17 | End: 2025-07-17 | Stop reason: HOSPADM

## 2025-07-16 RX ORDER — DOXYCYCLINE HYCLATE 100 MG
100 TABLET ORAL EVERY 12 HOURS SCHEDULED
Status: DISCONTINUED | OUTPATIENT
Start: 2025-07-16 | End: 2025-07-17 | Stop reason: HOSPADM

## 2025-07-16 RX ORDER — INSULIN LISPRO 100 [IU]/ML
0-8 INJECTION, SOLUTION INTRAVENOUS; SUBCUTANEOUS
Status: DISCONTINUED | OUTPATIENT
Start: 2025-07-16 | End: 2025-07-16

## 2025-07-16 RX ORDER — ATORVASTATIN CALCIUM 10 MG/1
20 TABLET, FILM COATED ORAL NIGHTLY
Status: DISCONTINUED | OUTPATIENT
Start: 2025-07-16 | End: 2025-07-17 | Stop reason: HOSPADM

## 2025-07-16 RX ORDER — GLUCAGON 1 MG/ML
1 KIT INJECTION PRN
Status: DISCONTINUED | OUTPATIENT
Start: 2025-07-16 | End: 2025-07-17 | Stop reason: HOSPADM

## 2025-07-16 RX ORDER — INSULIN LISPRO 100 [IU]/ML
0-4 INJECTION, SOLUTION INTRAVENOUS; SUBCUTANEOUS
Status: DISCONTINUED | OUTPATIENT
Start: 2025-07-16 | End: 2025-07-17 | Stop reason: HOSPADM

## 2025-07-16 RX ORDER — PANTOPRAZOLE SODIUM 40 MG/1
40 TABLET, DELAYED RELEASE ORAL
Status: DISCONTINUED | OUTPATIENT
Start: 2025-07-17 | End: 2025-07-17 | Stop reason: HOSPADM

## 2025-07-16 RX ORDER — BUSPIRONE HYDROCHLORIDE 7.5 MG/1
15 TABLET ORAL 3 TIMES DAILY
Status: DISCONTINUED | OUTPATIENT
Start: 2025-07-16 | End: 2025-07-17 | Stop reason: HOSPADM

## 2025-07-16 RX ORDER — METHOCARBAMOL 750 MG/1
750 TABLET, FILM COATED ORAL 3 TIMES DAILY PRN
COMMUNITY

## 2025-07-16 RX ORDER — INSULIN GLARGINE 100 [IU]/ML
40 INJECTION, SOLUTION SUBCUTANEOUS NIGHTLY
Status: DISCONTINUED | OUTPATIENT
Start: 2025-07-16 | End: 2025-07-17 | Stop reason: HOSPADM

## 2025-07-16 RX ORDER — QUETIAPINE FUMARATE 200 MG/1
200 TABLET, FILM COATED ORAL
Status: DISCONTINUED | OUTPATIENT
Start: 2025-07-17 | End: 2025-07-17 | Stop reason: HOSPADM

## 2025-07-16 RX ORDER — METHOCARBAMOL 500 MG/1
750 TABLET, FILM COATED ORAL 3 TIMES DAILY PRN
Status: DISCONTINUED | OUTPATIENT
Start: 2025-07-16 | End: 2025-07-17 | Stop reason: HOSPADM

## 2025-07-16 RX ORDER — FLUTICASONE PROPIONATE 50 MCG
2 SPRAY, SUSPENSION (ML) NASAL 2 TIMES DAILY
Status: DISCONTINUED | OUTPATIENT
Start: 2025-07-16 | End: 2025-07-17 | Stop reason: HOSPADM

## 2025-07-16 RX ORDER — DEXTROSE MONOHYDRATE 100 MG/ML
INJECTION, SOLUTION INTRAVENOUS CONTINUOUS PRN
Status: DISCONTINUED | OUTPATIENT
Start: 2025-07-16 | End: 2025-07-16

## 2025-07-16 RX ORDER — INSULIN LISPRO 100 [IU]/ML
10 INJECTION, SOLUTION INTRAVENOUS; SUBCUTANEOUS
Status: DISCONTINUED | OUTPATIENT
Start: 2025-07-16 | End: 2025-07-17 | Stop reason: HOSPADM

## 2025-07-16 RX ORDER — LIDOCAINE 50 MG/G
1 PATCH TOPICAL DAILY
COMMUNITY

## 2025-07-16 RX ORDER — INSULIN LISPRO 100 [IU]/ML
14 INJECTION, SOLUTION INTRAVENOUS; SUBCUTANEOUS 2 TIMES DAILY
COMMUNITY

## 2025-07-16 RX ORDER — INSULIN GLARGINE 100 [IU]/ML
40 INJECTION, SOLUTION SUBCUTANEOUS DAILY
Status: DISCONTINUED | OUTPATIENT
Start: 2025-07-16 | End: 2025-07-16

## 2025-07-16 RX ORDER — QUETIAPINE FUMARATE 200 MG/1
800 TABLET, FILM COATED ORAL NIGHTLY
Status: DISCONTINUED | OUTPATIENT
Start: 2025-07-16 | End: 2025-07-17 | Stop reason: HOSPADM

## 2025-07-16 RX ORDER — MIRTAZAPINE 15 MG/1
15 TABLET, FILM COATED ORAL NIGHTLY
Status: DISCONTINUED | OUTPATIENT
Start: 2025-07-16 | End: 2025-07-17 | Stop reason: HOSPADM

## 2025-07-16 RX ORDER — POLYETHYLENE GLYCOL 3350 17 G
2 POWDER IN PACKET (EA) ORAL
Status: DISCONTINUED | OUTPATIENT
Start: 2025-07-16 | End: 2025-07-17 | Stop reason: HOSPADM

## 2025-07-16 RX ORDER — TOPIRAMATE 100 MG/1
200 TABLET, FILM COATED ORAL NIGHTLY
Status: DISCONTINUED | OUTPATIENT
Start: 2025-07-16 | End: 2025-07-17 | Stop reason: HOSPADM

## 2025-07-16 RX ORDER — MIRTAZAPINE 15 MG/1
15 TABLET, FILM COATED ORAL NIGHTLY
COMMUNITY

## 2025-07-16 RX ORDER — LITHIUM CARBONATE 450 MG
450 TABLET, EXTENDED RELEASE ORAL 2 TIMES DAILY
Status: DISCONTINUED | OUTPATIENT
Start: 2025-07-16 | End: 2025-07-17 | Stop reason: HOSPADM

## 2025-07-16 RX ADMIN — DOXYCYCLINE HYCLATE 100 MG: 100 TABLET, COATED ORAL at 13:34

## 2025-07-16 RX ADMIN — ATORVASTATIN CALCIUM 20 MG: 10 TABLET, FILM COATED ORAL at 20:52

## 2025-07-16 RX ADMIN — INSULIN GLARGINE 40 UNITS: 100 INJECTION, SOLUTION SUBCUTANEOUS at 20:54

## 2025-07-16 RX ADMIN — FLUTICASONE PROPIONATE 2 SPRAY: 50 SPRAY, METERED NASAL at 20:52

## 2025-07-16 RX ADMIN — QUETIAPINE FUMARATE 800 MG: 200 TABLET ORAL at 20:51

## 2025-07-16 RX ADMIN — TOPIRAMATE 200 MG: 100 TABLET, FILM COATED ORAL at 20:51

## 2025-07-16 RX ADMIN — INSULIN LISPRO 10 UNITS: 100 INJECTION, SOLUTION INTRAVENOUS; SUBCUTANEOUS at 16:58

## 2025-07-16 RX ADMIN — INSULIN LISPRO 1 UNITS: 100 INJECTION, SOLUTION INTRAVENOUS; SUBCUTANEOUS at 16:58

## 2025-07-16 RX ADMIN — DOXYCYCLINE HYCLATE 100 MG: 100 TABLET, COATED ORAL at 20:52

## 2025-07-16 RX ADMIN — INSULIN LISPRO 2 UNITS: 100 INJECTION, SOLUTION INTRAVENOUS; SUBCUTANEOUS at 13:38

## 2025-07-16 RX ADMIN — MIRTAZAPINE 15 MG: 15 TABLET, FILM COATED ORAL at 20:52

## 2025-07-16 RX ADMIN — FLUTICASONE PROPIONATE 2 SPRAY: 50 SPRAY, METERED NASAL at 13:35

## 2025-07-16 RX ADMIN — BUSPIRONE HYDROCHLORIDE 15 MG: 7.5 TABLET ORAL at 20:52

## 2025-07-16 RX ADMIN — INSULIN LISPRO 1 UNITS: 100 INJECTION, SOLUTION INTRAVENOUS; SUBCUTANEOUS at 20:53

## 2025-07-16 RX ADMIN — BUSPIRONE HYDROCHLORIDE 15 MG: 7.5 TABLET ORAL at 16:58

## 2025-07-16 RX ADMIN — LITHIUM CARBONATE 450 MG: 450 TABLET ORAL at 20:52

## 2025-07-16 ASSESSMENT — PATIENT HEALTH QUESTIONNAIRE - PHQ9
6. FEELING BAD ABOUT YOURSELF - OR THAT YOU ARE A FAILURE OR HAVE LET YOURSELF OR YOUR FAMILY DOWN: MORE THAN HALF THE DAYS
SUM OF ALL RESPONSES TO PHQ QUESTIONS 1-9: 18
4. FEELING TIRED OR HAVING LITTLE ENERGY: NEARLY EVERY DAY
5. POOR APPETITE OR OVEREATING: MORE THAN HALF THE DAYS
8. MOVING OR SPEAKING SO SLOWLY THAT OTHER PEOPLE COULD HAVE NOTICED. OR THE OPPOSITE, BEING SO FIGETY OR RESTLESS THAT YOU HAVE BEEN MOVING AROUND A LOT MORE THAN USUAL: NOT AT ALL
10. IF YOU CHECKED OFF ANY PROBLEMS, HOW DIFFICULT HAVE THESE PROBLEMS MADE IT FOR YOU TO DO YOUR WORK, TAKE CARE OF THINGS AT HOME, OR GET ALONG WITH OTHER PEOPLE: SOMEWHAT DIFFICULT
SUM OF ALL RESPONSES TO PHQ QUESTIONS 1-9: 21
2. FEELING DOWN, DEPRESSED OR HOPELESS: NEARLY EVERY DAY
1. LITTLE INTEREST OR PLEASURE IN DOING THINGS: NEARLY EVERY DAY
SUM OF ALL RESPONSES TO PHQ QUESTIONS 1-9: 21
3. TROUBLE FALLING OR STAYING ASLEEP: NEARLY EVERY DAY
9. THOUGHTS THAT YOU WOULD BE BETTER OFF DEAD, OR OF HURTING YOURSELF: NEARLY EVERY DAY
7. TROUBLE CONCENTRATING ON THINGS, SUCH AS READING THE NEWSPAPER OR WATCHING TELEVISION: MORE THAN HALF THE DAYS
SUM OF ALL RESPONSES TO PHQ QUESTIONS 1-9: 21

## 2025-07-16 ASSESSMENT — SLEEP AND FATIGUE QUESTIONNAIRES
DO YOU HAVE DIFFICULTY SLEEPING: YES
SLEEP PATTERN: DIFFICULTY FALLING ASLEEP;DISTURBED/INTERRUPTED SLEEP;INSOMNIA
DO YOU USE A SLEEP AID: YES
AVERAGE NUMBER OF SLEEP HOURS: 6
DO YOU USE A SLEEP AID: COMMENT
DO YOU HAVE DIFFICULTY SLEEPING: COMMENT
AVERAGE NUMBER OF SLEEP HOURS: 8

## 2025-07-16 NOTE — ED NOTES
Pt initially stated that she would like ED tech know when she was ready to use the bedside commode; provider notified; 1 minute later pt identified that that she was ready to urinate;    Pt allowed to use bedside commode unsupervised per provider

## 2025-07-16 NOTE — BH NOTE
CSSR-S Assessment completed with patient who then scored MODERATE RISK.     Provider Dr. Trejo was notified of MODERATE RISK score, via Telephone at 8:40.      Were suicide precautions ordered: YES per protocol until assessed. Then discontinued.     If not ordered, justification as follows: Gita denies current suicidal ideation or desire. She verbalizes understanding and agreement of safe behavior. She is able to communicate effectively and appropriately and agrees to contact staff immediately if she has thoughts of suicide or self harm.     Completed by: ROLF Randall RN

## 2025-07-16 NOTE — GROUP NOTE
Group Therapy Note    Date: 7/16/2025    Group Start Time: 1000  Group End Time: 1045  Group Topic: Cognitive Skills    Ritesh Coffey        Group Therapy Note    Attendees: 14    Group engaged in multiple rounds of Scattergories. Group members were given a letter and 12 categories. The categories had to start with the letter given. Group members were split into two groups and were timed to complete the 12 categories.     Patient was invited to group but unable to attend.         Signature:  RITESH BENSON
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
## 2025-07-16 NOTE — ED NOTES
RN attempted to obtain urine sample x2. Pt stating \"I can not make myself pee\" education provided on reasoning for need of urine. Pt continues to refuse, and becoming agitated

## 2025-07-16 NOTE — BH NOTE
8:25 Admitted to intake room per stretcher. Accompanied by transport staff and . Security screening done per  without incident. Belongings labeled and taken to team station for sorting and storage. Food and fluid offered. Denies pain at this time. Oriented to unit and room. Dressed in gown.

## 2025-07-16 NOTE — ED NOTES
[facility] discharged [facility] to [facility].  Transfer discussed with patient. All questions answered. Patient verbalizes understanding of discharge instructions. Report given to [facility], all questions answered.

## 2025-07-16 NOTE — PROGRESS NOTES
Patient with cyclobenzaprine ingestion.  Patient's labs were unremarkable and she has had stable vital signs and normal EKG while being observed in the ED for 6 hours.  Poison control had recommended this observation period and then stated the patient could be medically cleared for psychiatric evaluation.  Patient is considered medically clear at this time she continues to have a sitter.

## 2025-07-16 NOTE — ED PROVIDER NOTES
(gastroesophageal reflux disease), Headache, blood clots, Hyperlipidemia, Hypertension, Influenza A (12/31/2014), Lives in group home, Morbid obesity ([facility]), Narcotic addiction ([facility]), Pain management, Pseudotumor cerebri, Pulmonary emboli ([facility]) (05/2023), Right thyroid nodule, Sleep apnea, Smoking, Suicide ideation, and Wears glasses.    CONSULTS: (Who and What was discussed)  None      Records Reviewed (External, Source and Summary)     CC/HPI Summary, DDx, ED Course, and Reassessment: This is a 50-year-old anxious, depressed female with attempted suicide attempt by taking 25 to 30 10 mg cyclobenzaprine with prior to arrival.  Her vitals show tachycardia, otherwise within normal limits.I spoke with poison control as above directly after HPI.  If she remains asymptomatic after 6 hours she can be medically cleared.  An EKG shows NSR.   Blood testing was performed showing no acute findings.  UA negative.  Patient medically cleared for psych placement.        Disposition Considerations (tests considered but not done, Admit vs D/C, Shared Decision Making, Pt Expectation of Test or Tx.):  Considered admission medically, however spoke with poison control. Will transfer to psych unit as patient remained very stable throughout her 5.5 + hour stay.      The patient tolerated their visit well.  The patient and / or the family were informed of the results of any tests, a time was given to answer questions, a plan was proposed and they agreed with plan.    I am the Primary Clinician of Record.  FINAL IMPRESSION      1. Suicide attempt by drug ingestion, initial encounter ([facility])    2. Suicidal ideation          DISPOSITION/PLAN     DISPOSITION                 PATIENT REFERRED TO:  No follow-up provider specified.    DISCHARGE MEDICATIONS:  New Prescriptions    No medications on file       DISCONTINUED MEDICATIONS:  Discontinued Medications    No medications on file              (Please note that portions of this note were

## 2025-07-16 NOTE — GROUP NOTE
Group Therapy Note    Date: 7/16/2025    Group Start Time: 1115  Group End Time: 1200  Group Topic: Psychoeducation    [facility] Ilda Melo LSW        Group Therapy Note  Patient was invited to group but unable to attend.     Attendees: 14     Signature:  BRO Baez

## 2025-07-16 NOTE — ED NOTES
On arrival pt refusing bloodwork, RN introducing self at this time. Pt informing RN that she is agreeable to bloodwork if her casemanager is allowed to come bedside

## 2025-07-16 NOTE — PLAN OF CARE
Problem: Chronic Conditions and Co-morbidities  Goal: Patient's chronic conditions and co-morbidity symptoms are monitored and maintained or improved  Outcome: Progressing     Problem: Anxiety  Goal: Will report anxiety at manageable levels  Description: INTERVENTIONS:  1. Administer medication as ordered  2. Teach and rehearse alternative coping skills  3. Provide emotional support with 1:1 interaction with staff  Outcome: Progressing     Problem: Coping  Goal: Pt/Family able to verbalize concerns and demonstrate effective coping strategies  Description: INTERVENTIONS:  1. Assist patient/family to identify coping skills, available support systems and cultural and spiritual values  2. Provide emotional support, including active listening and acknowledgement of concerns of patient and caregivers  3. Reduce environmental stimuli, as able  4. Instruct patient/family in relaxation techniques, as appropriate  5. Assess for spiritual pain/suffering and initiate Spiritual Care, Psychosocial Clinical Specialist consults as needed  Outcome: Progressing     Problem: Behavior  Goal: Pt/Family maintain appropriate behavior and adhere to behavioral management agreement, if implemented  Description: INTERVENTIONS:  1. Assess patient/family's coping skills and  non-compliant behavior (including use of illegal substances)  2. Notify security of behavior or suspected illegal substances which indicate the need for search of the family and/or belongings  3. Encourage verbalization of thoughts and concerns in a socially appropriate manner  4. Utilize positive, consistent limit setting strategies supporting safety of patient, staff and others  5. Encourage participation in the decision making process about the behavioral management agreement  6. If a visitor's behavior poses a threat to safety call refer to organization policy.  7. Initiate consult with , Psychosocial CNS, Spiritual Care as appropriate  Outcome:

## 2025-07-16 NOTE — H&P
Hospital Medicine History & Physical      PCP: Denise Chase APRN - NP    Date of Admission: 7/16/2025    Date of Service: Pt seen/examined on 07/16/25       Chief Complaint:  No chief complaint on file.        History Of Present Illness:      The patient is a 50 y.o. female with hx of substance abuse, HLD, hypothyroidism, HTN who presented to ED for suicide attempt with intentional drug overdose with cyclobenzaprine.  Patient was seen and evaluated in the ED by the ED medical provider, patient was medically cleared for admission to [facility] at [facility].  This note serves as an admission medical H&P.    Tobacco use: endorses   ETOH use: denies  Illicit drug use: denies    Patient denies any medical complaints.    Past Medical History:        Diagnosis Date    Arthritis     Asthma     Bipolar disorder (HCC)     Borderline personality disorder (HCC)     CAP (community acquired pneumonia) 09/29/2015    Chronic kidney disease     proteinuria    Depression     Diabetes mellitus (HCC)     Drug abuse (HCC)     10/20/2017 heroin-none since 2017    GERD (gastroesophageal reflux disease)     Headache     Hx of blood clots     bilateral pe and right calf dvt    Hyperlipidemia     Hypertension     Influenza A 12/31/2014    Lives in group home     \"[facility]\" 1/2025    Morbid obesity (HCC)     Narcotic addiction (HCC)     Pain management     not since 2017    Pseudotumor cerebri     dx 3/12 by neurologist. OP [id number] 11/13/13    Pulmonary emboli (HCC) 05/2023    bilateral    Right thyroid nodule     Sleep apnea     no CPAP    Smoking     Suicide ideation     chronic    Wears glasses        Past Surgical History:        Procedure Laterality Date    CHOLECYSTECTOMY      COLONOSCOPY      17 YRS AGO ,NORMAL    COLONOSCOPY N/A 11/16/2023    COLONOSCOPY performed by Gianfranco Anderson MD at [facility] ENDOSCOPY    CYSTOSCOPY      CYSTOSCOPY  5/816/14, per pt had multiple, last 2016    CYSTOSCOPY, URETHRAL DILATATION      DILATION AND 
   1. Depression unspecified.  2. Hyperthyroidism.  3. Type 2 diabetes.  4. Hypertension.  5. History of borderline personality disorder and schizoaffective disorder bipolar type.    PLAN:    1. Admit for short stabilization.  2. Resume outpatient medication regimen as prescribed.  Order q.15 minutes checks for safety, programming, and p.r.n. medication for anxiety, agitation, insomnia.  3. Hospitalist consult for admission.  4. Collateral information for diagnostic clarification and care coordination.  5. Estimated length of stay 2 to 3 days for stabilization.  She was admitted on a statement of belief, but agrees to stay voluntarily.    75 minutes were spent with the patient completing this evaluation, and more than 50% of that time was spent completing this evaluation, providing counseling, and planning treatment with the patient.          LIZET DENT MD      D:  07/16/2025 15:39:20     T:  07/16/2025 16:16:42     MELONIE/BOOGIE  Job #:  [id number]     Doc#:  [id number]

## 2025-07-16 NOTE — ED TRIAGE NOTES
Pt arrived via EMS; currently resides in a group home; states that she has been having issues with her roommate, other residents, and staff recently and endorses increasing feelings of hopelessness; states that she has a good relationship with her  and met with the her  this morning and had a telephone call with her therapist earlier today; pt took 25-30 cyclobenzapine at apx 2145

## 2025-07-16 NOTE — BH NOTE
4 Eyes Skin Assessment     The patient is being assessed for  Admission    I agree that 2 RN's have performed a thorough Head to Toe Skin Assessment on the patient. ALL assessment sites listed below have been assessed.       Areas assessed for pressure by both nurses: yes  [x]   Head, Face, and Ears   [x]   Shoulders, Back, and Chest  [x]   Arms, Elbows, and Hands   []   Coccyx, Sacrum, and Ischum  [x]   Legs, Feet, and Heels                                Skin Assessed Under all Medical Devices by both nurses:N/A           All Mepilex Borders were peeled back and area peeked at by both nurses:  N/A  Please list where Mepilex Borders are located:  N/A                 Does the Patient have Skin Breakdown related to pressure?  No            Carlos Prevention initiated:  No   Wound Care Orders initiated:  NA      [facility] nurse consulted for Pressure Injury (Stage 3,4, Unstageable, DTI, NWPT, Complex wounds)and New or Established Ostomies:  NA        Nurse 1 eSignature: Electronically signed by Daphne Randall RN on 7/16/25 at 6:23 PM EDT    **SHARE this note so that the co-signing nurse is able to place an eSignature**    Nurse 2 eSignature: Electronically signed by Caitlin Hansen RN on 7/16/25 at 7:27 PM EDT

## 2025-07-16 NOTE — ED NOTES
Bedside commode placed in room for pt to use to provide a urine sample; per provider pt is permitted to use the bedside commode with the curtain closed; RN voiced concern that pt requires and constant observer and this is not consistent with constant observer policy; provider acknowledged this and identified that the need for a urine sample is significant enough to warrant allowing patient to use the bedside commode without the constant observer watching her

## 2025-07-16 NOTE — BH NOTE
4 Eyes Skin Assessment     The patient is being assessed for  Admission    I agree that 2 RN's have performed a thorough Head to Toe Skin Assessment on the patient. ALL assessment sites listed below have been assessed.       Areas assessed for pressure by both nurses: yes  [x]   Head, Face, and Ears   [x]   Shoulders, Back, and Chest  [x]   Arms, Elbows, and Hands   []   Coccyx, Sacrum, and Ischum  [x]   Legs, Feet, and Heels                                Skin Assessed Under all Medical Devices by both nurses:  N/A           All Mepilex Borders were peeled back and area peeked at by both nurses:  N/A  Please list where Mepilex Borders are located:  N/A                 Does the Patient have Skin Breakdown related to pressure?  No           Carlos Prevention initiated:  No   Wound Care Orders initiated:  NA      [facility] nurse consulted for Pressure Injury (Stage 3,4, Unstageable, DTI, NWPT, Complex wounds)and New or Established Ostomies:  NA        Nurse 1 eSignature: Electronically signed by Daphne Randall RN on 7/16/25 at 10:31 AM EDT    **SHARE this note so that the co-signing nurse is able to place an eSignature**    Nurse 2 eSignature: {Esignature:[id number]}

## 2025-07-16 NOTE — CARE COORDINATION
Clinician completed a chart review of the psychosocial, [facility], and [facility] due to patient's mental status. Per chart, pt is connected to [facility] for therapy, psychiatry, and case management.   Ilda Silverio, W       07/16/25 2232   Psychiatric History   Psychiatric history treatment Psychiatric admissions;Current treatment  (per chart-Patient has had multiple admissions over her lifetime including: [facility], [facility], [facility], [facility] [place], [facility] and several on the [facility]. Pt has services at [facility] for psych and therapy)   Are there any medication issues?   (JOELLE due to mental status)   Recent Psychological Experiences Other(comment)  (per chart- complaint of intentional drug overdose with cyclobenzaprine.  She states that she took 25 to 30 tablets of 10 mg cyclobenzaprine in order to end her life.)   Support System   Support system Access to others  (per chart- pt has a good relationship with her therapist and  at [facility])   Types of Support System Other (Comment)  (per chart- pt has a good relationship with her therapist and  at [facility])   Problems in support system Losses;Other (Comment)  (per chart- pt lost mother in 2021 which has impacted her mental health)   Current Living Situation   Home Living Adequate  (per chart pt lives in group home)   Living information Lives with others  (per chart-pt lives in a group home with roommates and staff)   Problems with living situation  Yes   Relationship issues per chart- pt \"states that she lives at a group home and some of the people that she lives with have been bothering her. Including her roommate, other residents and staff.\"   Lack of basic needs   (JOELLE due to mental status)   SSDI/SSI JOELLE due to mental status   Other government assistance [employer] CHOICE-PPO   Problems with environment per chart- pt \"states that she lives at a group home and some of the people that she lives with have been bothering her.

## 2025-07-16 NOTE — ED NOTES
Pt agreed to provide a urine sample but only if she can be completely alone; pt was offered the option of being alone in the bathroom with constant observer standing with foot in the doorway to prevent the door from being completely closed; pt refused and states that she will only provide a sample if she is permitted to be alone in the restroom with the door closed; providers notified;  CYNDI Mcdermott states that she will speak with pt

## 2025-07-16 NOTE — ED NOTES
Transfer Center Handoff for Behavioral Health Transfers      Patient's Current Location: [facility] EMERGENCY DEPARTMENT     Chief Complaint   Patient presents with    Drug Overdose     Pt presents in the ED for c/o intentional overdose; pt endorses taking 25-30 cyclobenzaprine at apx 2145; pt endorses that she did this in an attempt to end her life       Current or History of Violent Behavior: No    Currently in Restraints Now or During this Encounter: No  (Specify if Agitation or self harm is noted in ED?)  If yes, please describe behaviors requiring restraint:             Medical Clearance Documented and Verified in the Chart: Yes    Allergies   Allergen Reactions    Baclofen Other (See Comments)     Confusion        Cephalexin Nausea And Vomiting and Nausea Only     GI Distress    Clozapine Other (See Comments)     Decreased WBC ,    Makes white count drop    Divalproex Sodium      Per pt caused pancreatitis    Duloxetine Rash     Other Reaction(s): Other (See Comments)    Fluoxetine Hcl Rash    Morphine Nausea Only     Other Reaction(s): GI Distress, Unknown    Very ill    Ineffective pain control    Olanzapine Anaphylaxis    Olanzapine Anaphylaxis and Swelling     Swelling of throat    Penicillins Rash    Tramadol Hcl Rash    Adhesive Tape      Pt states that every time they use it to close cuts, it gets infected.  \"Burns skin\" okay to use paper tape and okay to use tegaderm for short time     Clozapine [Clozapine] Other (See Comments)     neutropenia    Pregabalin Other (See Comments)     SICK TO STOMACH    Pregabalin Other (See Comments)     SICK TO STOMACH    Quetiapine Fumarate Other (See Comments)     pt states that she has recently taken quetiapine and has not had an issue but wants to keep on her allergy list      Butabarbital Rash    Butalbital-Apap-Caffeine Rash    Cephalexin Nausea And Vomiting    Cymbalta [Duloxetine Hcl] Rash    Divalproex Sodium Other (See Comments)      GI

## 2025-07-17 ENCOUNTER — RESULTS FOLLOW-UP (OUTPATIENT)
Dept: EMERGENCY DEPT | Age: 50
End: 2025-07-17

## 2025-07-17 VITALS
WEIGHT: 293 LBS | BODY MASS INDEX: 50.02 KG/M2 | RESPIRATION RATE: 16 BRPM | HEART RATE: 81 BPM | OXYGEN SATURATION: 93 % | SYSTOLIC BLOOD PRESSURE: 121 MMHG | HEIGHT: 64 IN | TEMPERATURE: 97.6 F | DIASTOLIC BLOOD PRESSURE: 66 MMHG

## 2025-07-17 LAB
BACTERIA UR CULT: NORMAL
EST. AVERAGE GLUCOSE BLD GHB EST-MCNC: 165.7 MG/DL
GLUCOSE BLD-MCNC: 230 MG/DL (ref 70–99)
GLUCOSE BLD-MCNC: 231 MG/DL (ref 70–99)
GLUCOSE BLD-MCNC: 295 MG/DL (ref 70–99)
HBA1C MFR BLD: 7.4 %
PERFORMED ON: ABNORMAL

## 2025-07-17 PROCEDURE — 83036 HEMOGLOBIN GLYCOSYLATED A1C: CPT

## 2025-07-17 PROCEDURE — 5130000000 HC BRIDGE APPOINTMENT

## 2025-07-17 PROCEDURE — 6370000000 HC RX 637 (ALT 250 FOR IP)

## 2025-07-17 PROCEDURE — 36415 COLL VENOUS BLD VENIPUNCTURE: CPT

## 2025-07-17 PROCEDURE — 6370000000 HC RX 637 (ALT 250 FOR IP): Performed by: PSYCHIATRY & NEUROLOGY

## 2025-07-17 RX ORDER — DOXYCYCLINE HYCLATE 100 MG
100 TABLET ORAL EVERY 12 HOURS SCHEDULED
Qty: 7 TABLET | Refills: 0 | Status: SHIPPED | OUTPATIENT
Start: 2025-07-17 | End: 2025-07-21

## 2025-07-17 RX ADMIN — QUETIAPINE FUMARATE 200 MG: 200 TABLET ORAL at 09:14

## 2025-07-17 RX ADMIN — BUPROPION HYDROCHLORIDE 450 MG: 300 TABLET, EXTENDED RELEASE ORAL at 09:13

## 2025-07-17 RX ADMIN — INSULIN LISPRO 2 UNITS: 100 INJECTION, SOLUTION INTRAVENOUS; SUBCUTANEOUS at 12:14

## 2025-07-17 RX ADMIN — DOXYCYCLINE HYCLATE 100 MG: 100 TABLET, COATED ORAL at 09:14

## 2025-07-17 RX ADMIN — FLUTICASONE PROPIONATE 2 SPRAY: 50 SPRAY, METERED NASAL at 09:20

## 2025-07-17 RX ADMIN — LITHIUM CARBONATE 450 MG: 450 TABLET ORAL at 09:13

## 2025-07-17 RX ADMIN — BUSPIRONE HYDROCHLORIDE 15 MG: 7.5 TABLET ORAL at 09:13

## 2025-07-17 RX ADMIN — INSULIN LISPRO 14 UNITS: 100 INJECTION, SOLUTION INTRAVENOUS; SUBCUTANEOUS at 12:14

## 2025-07-17 ASSESSMENT — PAIN SCALES - GENERAL: PAINLEVEL_OUTOF10: 0

## 2025-07-17 NOTE — PLAN OF CARE
Problem: Chronic Conditions and Co-morbidities  Goal: Patient's chronic conditions and co-morbidity symptoms are monitored and maintained or improved  7/16/2025 2340 by Maranda Silveira RN  Outcome: Progressing     Problem: Anxiety  Goal: Will report anxiety at manageable levels  Description: INTERVENTIONS:  1. Administer medication as ordered  2. Teach and rehearse alternative coping skills  3. Provide emotional support with 1:1 interaction with staff  7/16/2025 2340 by Maranda Silveira RN  Outcome: Progressing  Flowsheets (Taken 7/16/2025 1807 by Daphne Randall, DONNA)  Will report anxiety at manageable levels: Administer medication as ordered     Problem: Coping  Goal: Pt/Family able to verbalize concerns and demonstrate effective coping strategies  Description: INTERVENTIONS:  1. Assist patient/family to identify coping skills, available support systems and cultural and spiritual values  2. Provide emotional support, including active listening and acknowledgement of concerns of patient and caregivers  3. Reduce environmental stimuli, as able  4. Instruct patient/family in relaxation techniques, as appropriate  5. Assess for spiritual pain/suffering and initiate Spiritual Care, Psychosocial Clinical Specialist consults as needed  7/16/2025 2340 by Maranda Silveira, RN  Outcome: Progressing   Pt has been in bed all shift thus far.  Pt sleeping quite a bit. When awake pt has been verbal, pleasant and cooperative.  She denies any suicidal ideation and states she will remain safe.  Pt states that she would rate her depression at a 6/10 and her anxiety at 5/10. She spoke with Dr. Lunsford and she stated that he might release her in the AM. States she would just like to leave and states she will remain safe. Most of conversation s/w superficial.   Pt's FSBS was 200 mg/dl at 2025.  Given Lantus 40 units  and Humalog 1 unit at  2031.  States that she normally uses an insulin pump but felt like the Lantus 40 units was a

## 2025-07-17 NOTE — TRANSITION OF CARE
Behavioral Health Transition Record    Patient Name: Gita Villalpando  YOB: 1975   Medical Record Number: [id number]  Date of Admission: 7/16/2025  9:01 AM   Date of Discharge: 7/17/2025    Attending Provider: Laurent Lunsford MD   Discharging Provider: Laurent Lunsford MD   To contact this individual call [phone] and ask the  to page.  If unavailable, ask to be transferred to Behavioral Health Provider on call.  A Behavioral Health Provider will be available on call 24/7 and during holidays.    Primary Care Provider: Denise Chase APRN - NP    Allergies   Allergen Reactions    Baclofen Other (See Comments)     Confusion        Cephalexin Nausea And Vomiting and Nausea Only     GI Distress    Clozapine Other (See Comments)     Decreased WBC ,    Makes white count drop    Divalproex Sodium      Per pt caused pancreatitis    Duloxetine Rash     Other Reaction(s): Other (See Comments)    Fluoxetine Hcl Rash    Morphine Nausea Only     Other Reaction(s): GI Distress, Unknown    Very ill    Ineffective pain control    Olanzapine Anaphylaxis    Olanzapine Anaphylaxis and Swelling     Swelling of throat    Penicillins Rash    Tramadol Hcl Rash    Adhesive Tape      Pt states that every time they use it to close cuts, it gets infected.  \"Burns skin\" okay to use paper tape and okay to use tegaderm for short time     Clozapine [Clozapine] Other (See Comments)     neutropenia    Pregabalin Other (See Comments)     SICK TO STOMACH    Pregabalin Other (See Comments)     SICK TO STOMACH    Quetiapine Fumarate Other (See Comments)     pt states that she has recently taken quetiapine and has not had an issue but wants to keep on her allergy list      Butabarbital Rash    Butalbital-Apap-Caffeine Rash    Cephalexin Nausea And Vomiting    Cymbalta [Duloxetine Hcl] Rash    Divalproex Sodium Other (See Comments)      GI Distress  pancreatitis    Exenatide Nausea And Vomiting     Other

## 2025-07-17 NOTE — PLAN OF CARE
Problem: Anxiety  Goal: Will report anxiety at manageable levels  Description: INTERVENTIONS:  1. Administer medication as ordered  2. Teach and rehearse alternative coping skills  3. Provide emotional support with 1:1 interaction with staff  7/17/2025 1331 by Radha Delgado RN  Outcome: Progressing     Problem: Behavior  Goal: Pt/Family maintain appropriate behavior and adhere to behavioral management agreement, if implemented  Description: INTERVENTIONS:  1. Assess patient/family's coping skills and  non-compliant behavior (including use of illegal substances)  2. Notify security of behavior or suspected illegal substances which indicate the need for search of the family and/or belongings  3. Encourage verbalization of thoughts and concerns in a socially appropriate manner  4. Utilize positive, consistent limit setting strategies supporting safety of patient, staff and others  5. Encourage participation in the decision making process about the behavioral management agreement  6. If a visitor's behavior poses a threat to safety call refer to organization policy.  7. Initiate consult with , Psychosocial CNS, Spiritual Care as appropriate  Outcome: Progressing    Pt has been visible on the unit. She denies SI/HI/AVH and no RTIS noted. She has been medication compliant. Pt feels ready and safe to discharge today. Denies needs currently

## 2025-07-17 NOTE — PROGRESS NOTES
Behavioral Services  Medicare Certification Upon Admission    I certify that this patient's inpatient psychiatric hospital admission is medically necessary for:    [x] (1) Treatment which could reasonably be expected to improve this patient's condition,       [x] (2) Or for diagnostic study;     AND     [x](2) The inpatient psychiatric services are provided while the individual is under the care of a physician and are included in the individualized plan of care.    Estimated length of stay/service 2-3 days    Plan for post-hospital care incomplete     Electronically signed by LIZET DENT MD on 7/16/2025 at 3:40 PM      
Bridge Appointment completed: Reviewed Discharge Instructions with patient.    Patient verbalizes understanding and agreement with the discharge plan using the teachback method.     Referral for Outpatient Tobacco Cessation Counseling, upon discharge (albert X if applicable and completed):    ( )  Hospital staff assisted patient to call Quit Line or faxed referral                                   during hospitalization                  ( )  Recognizing danger situations (included triggers and roadblocks), if not completed on admission                    ( )  Coping skills (new ways to manage stress, exercise, relaxation techniques, changing routine, distraction), if not completed on admission                                                           ( )  Basic information about quitting (benefits of quitting, techniques in how to quit, available resources, if not completed on admission  ( ) [facility]   ( ) Patient refused referral  ( ) Patient refused counseling  ( x) Patient refused smoking cessation medication upon discharge    Vaccinations (albert X if applicable and completed):  ( ) Patient states already received influenza vaccine elsewhere  ( ) Patient received influenza vaccine during this hospitalization  ( x) Patient refused influenza vaccine at this time    
(H) 07/07/2015    TRIG 269 (H) 11/02/2013    TRIG 300 (H) 04/22/2013    TRIG 290 (H) 06/01/2012     Lab Results   Component Value Date    HDL 39 (L) 01/20/2017    HDL 54 03/15/2016    HDL 60 07/07/2015    HDL 50 11/02/2013    HDL 58 04/22/2013    HDL 66 (H) 06/01/2012     No components found for: \"LDLCAL\"  No components found for: \"LABVLDL\"    Radha Delgado RN

## 2025-07-21 ENCOUNTER — FOLLOWUP TELEPHONE ENCOUNTER (OUTPATIENT)
Dept: PSYCHIATRY | Age: 50
End: 2025-07-21

## 2025-07-22 ENCOUNTER — FOLLOWUP TELEPHONE ENCOUNTER (OUTPATIENT)
Dept: PSYCHIATRY | Age: 50
End: 2025-07-22

## 2025-08-19 ENCOUNTER — CLINICAL DOCUMENTATION (OUTPATIENT)
Age: 50
End: 2025-08-19

## (undated) DEVICE — BLADE ES L4IN INSUL EDGE

## (undated) DEVICE — LIQUIBAND RAPID ADHESIVE 36/CS 0.8ML: Brand: MEDLINE

## (undated) DEVICE — HEAD & NECK: Brand: MEDLINE INDUSTRIES, INC.

## (undated) DEVICE — TRAP FLUID

## (undated) DEVICE — AGENT HEMOSTATIC SURG ORIGINAL ABS 2X14IN LOOSE KNIT 12/CA

## (undated) DEVICE — SPONGE,PEANUT,XRAY,ST,SM,3/8",5/CARD: Brand: MEDLINE INDUSTRIES, INC.

## (undated) DEVICE — TOWEL,OR,DSP,ST,BLUE,DLX,8/PK,10PK/CS: Brand: MEDLINE

## (undated) DEVICE — STAPLER SKIN H3.9MM WIRE DIA0.58MM CRWN 6.9MM 35 STPL ROT

## (undated) DEVICE — SYRINGE IRRIG 60ML SFT PLIABLE BLB EZ TO GRP 1 HND USE W/

## (undated) DEVICE — GLOVE ORANGE PI 7 1/2   MSG9075

## (undated) DEVICE — SUTURE PERMA-HAND SZ 3-0 L144IN NONABSORBABLE BLK LIGAPAK LA54G

## (undated) DEVICE — DRAPE,INSTRUMENT,MAGNETIC,10X16: Brand: MEDLINE

## (undated) DEVICE — BLANKET WRM W40.2XL55.9IN IORT LO BODY + MISTRAL AIR

## (undated) DEVICE — NEPTUNE E-SEP SMOKE EVACUATION PENCIL, COATED, 70MM BLADE, PUSH BUTTON SWITCH: Brand: NEPTUNE E-SEP

## (undated) DEVICE — STANDARD HYPODERMIC NEEDLE,POLYPROPYLENE HUB: Brand: MONOJECT

## (undated) DEVICE — FORCEPS BX L240CM JAW DIA2.8MM L CAP W/ NDL MIC MESH TOOTH

## (undated) DEVICE — PROBE 8225101 5PK STD PRASS FL TIP ROHS

## (undated) DEVICE — SUTURE PLN GUT SZ 5-0 L18IN ABSRB YELLOWISH TAN L13MM PC-1 1915G

## (undated) DEVICE — SYRINGE MED 10ML LUERLOCK TIP W/O SFTY DISP

## (undated) DEVICE — SUTURE VICRYL + SZ 3-0 L18IN ABSRB UD SH 1/2 CIR TAPERCUT NDL VCP864D

## (undated) DEVICE — BLADE ES ELASTOMERIC COAT INSUL DURABLE BEND UPTO 90DEG

## (undated) DEVICE — TOWEL,STOP FLAG GOLD N-W: Brand: MEDLINE